# Patient Record
Sex: FEMALE | Race: WHITE | Employment: FULL TIME | URBAN - METROPOLITAN AREA
[De-identification: names, ages, dates, MRNs, and addresses within clinical notes are randomized per-mention and may not be internally consistent; named-entity substitution may affect disease eponyms.]

---

## 2019-09-04 ENCOUNTER — HOSPITAL ENCOUNTER (OUTPATIENT)
Dept: CT IMAGING | Facility: HOSPITAL | Age: 54
Discharge: HOME | End: 2019-09-04
Attending: OTOLARYNGOLOGY
Payer: COMMERCIAL

## 2019-09-04 DIAGNOSIS — J32.0 CHRONIC MAXILLARY SINUSITIS: ICD-10-CM

## 2019-09-04 PROCEDURE — 70486 CT MAXILLOFACIAL W/O DYE: CPT

## 2019-09-09 ENCOUNTER — TELEPHONE (OUTPATIENT)
Dept: NEUROLOGY | Facility: CLINIC | Age: 54
End: 2019-09-09

## 2019-09-09 NOTE — TELEPHONE ENCOUNTER
Patient called back to schedule appt  Her schedule changes week to week, will call back to reschedule

## 2022-09-10 ENCOUNTER — APPOINTMENT (OUTPATIENT)
Dept: LAB | Facility: HOSPITAL | Age: 57
End: 2022-09-10
Payer: COMMERCIAL

## 2022-09-10 ENCOUNTER — OFFICE VISIT (OUTPATIENT)
Dept: LAB | Facility: HOSPITAL | Age: 57
End: 2022-09-10
Payer: COMMERCIAL

## 2022-09-10 DIAGNOSIS — G47.33 OBSTRUCTIVE SLEEP APNEA: ICD-10-CM

## 2022-09-10 LAB
ANION GAP SERPL CALCULATED.3IONS-SCNC: 8 MMOL/L (ref 4–13)
BASOPHILS # BLD AUTO: 0.05 THOUSANDS/ΜL (ref 0–0.1)
BASOPHILS NFR BLD AUTO: 1 % (ref 0–1)
BUN SERPL-MCNC: 20 MG/DL (ref 5–25)
CALCIUM SERPL-MCNC: 9 MG/DL (ref 8.3–10.1)
CHLORIDE SERPL-SCNC: 100 MMOL/L (ref 96–108)
CO2 SERPL-SCNC: 28 MMOL/L (ref 21–32)
CREAT SERPL-MCNC: 1.38 MG/DL (ref 0.6–1.3)
EOSINOPHIL # BLD AUTO: 0.13 THOUSAND/ΜL (ref 0–0.61)
EOSINOPHIL NFR BLD AUTO: 2 % (ref 0–6)
ERYTHROCYTE [DISTWIDTH] IN BLOOD BY AUTOMATED COUNT: 13.2 % (ref 11.6–15.1)
GFR SERPL CREATININE-BSD FRML MDRD: 42 ML/MIN/1.73SQ M
GLUCOSE P FAST SERPL-MCNC: 95 MG/DL (ref 65–99)
HCT VFR BLD AUTO: 42.5 % (ref 34.8–46.1)
HGB BLD-MCNC: 14.1 G/DL (ref 11.5–15.4)
IMM GRANULOCYTES # BLD AUTO: 0.05 THOUSAND/UL (ref 0–0.2)
IMM GRANULOCYTES NFR BLD AUTO: 1 % (ref 0–2)
LYMPHOCYTES # BLD AUTO: 2.11 THOUSANDS/ΜL (ref 0.6–4.47)
LYMPHOCYTES NFR BLD AUTO: 24 % (ref 14–44)
MCH RBC QN AUTO: 29 PG (ref 26.8–34.3)
MCHC RBC AUTO-ENTMCNC: 33.2 G/DL (ref 31.4–37.4)
MCV RBC AUTO: 87 FL (ref 82–98)
MONOCYTES # BLD AUTO: 0.71 THOUSAND/ΜL (ref 0.17–1.22)
MONOCYTES NFR BLD AUTO: 8 % (ref 4–12)
NEUTROPHILS # BLD AUTO: 5.87 THOUSANDS/ΜL (ref 1.85–7.62)
NEUTS SEG NFR BLD AUTO: 64 % (ref 43–75)
NRBC BLD AUTO-RTO: 0 /100 WBCS
PLATELET # BLD AUTO: 244 THOUSANDS/UL (ref 149–390)
PMV BLD AUTO: 9.2 FL (ref 8.9–12.7)
POTASSIUM SERPL-SCNC: 4 MMOL/L (ref 3.5–5.3)
RBC # BLD AUTO: 4.86 MILLION/UL (ref 3.81–5.12)
SODIUM SERPL-SCNC: 136 MMOL/L (ref 135–147)
WBC # BLD AUTO: 8.92 THOUSAND/UL (ref 4.31–10.16)

## 2022-09-10 PROCEDURE — 36415 COLL VENOUS BLD VENIPUNCTURE: CPT

## 2022-09-10 PROCEDURE — 80048 BASIC METABOLIC PNL TOTAL CA: CPT

## 2022-09-10 PROCEDURE — 85025 COMPLETE CBC W/AUTO DIFF WBC: CPT

## 2022-09-10 PROCEDURE — 93005 ELECTROCARDIOGRAM TRACING: CPT

## 2022-09-15 LAB
ATRIAL RATE: 79 BPM
P AXIS: 30 DEGREES
PR INTERVAL: 180 MS
QRS AXIS: 6 DEGREES
QRSD INTERVAL: 88 MS
QT INTERVAL: 380 MS
QTC INTERVAL: 435 MS
T WAVE AXIS: 21 DEGREES
VENTRICULAR RATE: 79 BPM

## 2022-09-15 PROCEDURE — 93010 ELECTROCARDIOGRAM REPORT: CPT | Performed by: INTERNAL MEDICINE

## 2022-10-11 ENCOUNTER — OFFICE VISIT (OUTPATIENT)
Dept: SLEEP CENTER | Facility: CLINIC | Age: 57
End: 2022-10-11
Payer: COMMERCIAL

## 2022-10-11 VITALS
WEIGHT: 198 LBS | SYSTOLIC BLOOD PRESSURE: 94 MMHG | HEART RATE: 89 BPM | HEIGHT: 67 IN | DIASTOLIC BLOOD PRESSURE: 64 MMHG | BODY MASS INDEX: 31.08 KG/M2

## 2022-10-11 DIAGNOSIS — G47.33 OSA (OBSTRUCTIVE SLEEP APNEA): Primary | ICD-10-CM

## 2022-10-11 PROBLEM — R29.898 LEFT LEG WEAKNESS: Status: ACTIVE | Noted: 2019-06-01

## 2022-10-11 PROBLEM — E04.2 MULTIPLE THYROID NODULES: Status: ACTIVE | Noted: 2022-10-07

## 2022-10-11 PROBLEM — K21.9 GERD (GASTROESOPHAGEAL REFLUX DISEASE): Status: ACTIVE | Noted: 2019-05-31

## 2022-10-11 PROBLEM — M47.816 LUMBAR SPONDYLOSIS: Status: ACTIVE | Noted: 2020-09-24

## 2022-10-11 PROBLEM — J45.909 ASTHMA: Status: ACTIVE | Noted: 2019-05-31

## 2022-10-11 PROBLEM — M43.10 SPONDYLOLISTHESIS, ACQUIRED: Status: ACTIVE | Noted: 2020-09-02

## 2022-10-11 PROCEDURE — 99204 OFFICE O/P NEW MOD 45 MIN: CPT | Performed by: PSYCHIATRY & NEUROLOGY

## 2022-10-11 RX ORDER — AMLODIPINE BESYLATE 5 MG/1
5 TABLET ORAL DAILY
COMMUNITY
Start: 2022-09-20

## 2022-10-11 RX ORDER — FUROSEMIDE 40 MG/1
40 TABLET ORAL DAILY
COMMUNITY
Start: 2022-09-20

## 2022-10-11 RX ORDER — METHIMAZOLE 5 MG/1
5 TABLET ORAL DAILY
COMMUNITY

## 2022-10-11 RX ORDER — AMITRIPTYLINE HYDROCHLORIDE 25 MG/1
25 TABLET, FILM COATED ORAL
COMMUNITY
Start: 2022-08-22

## 2022-10-11 RX ORDER — OMEPRAZOLE 40 MG/1
40 CAPSULE, DELAYED RELEASE ORAL DAILY
COMMUNITY
Start: 2022-09-17

## 2022-10-11 RX ORDER — LIRAGLUTIDE 6 MG/ML
INJECTION, SOLUTION SUBCUTANEOUS
COMMUNITY
Start: 2022-10-06

## 2022-10-11 NOTE — PROGRESS NOTES
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  We discussed that once the hypoglossal nerve stimulator is activated, we will not know efficacy of the device until a sleep study is done, which is usually 3 months after activation  There is a chance that symptoms could worsen until they improve when optimal device settings are obtained  If she cannot tolerate device or she had a residually high AHI, she would not be a safe , and therefore would need to use CPAP or avoid bus driving  She thought about this and believes she will be able to do light duty, a non-driving position  Furthermore, she has an option of short-term disability which she would be willing to use if needed  Typically as noted, the sleep study is performed 3 months after device activation  We may consider a shorter time line in her case given that this relates to her employment  One could make the argument she has shift work disorder as she has an irregular sleep schedule due to her irregular work schedule  Consideration could be made for medication as treatment such as modafinil or Sunosi but this would need to be balanced by her medical history which includes hypertension (although her blood pressure is on the low side today)        Subjective:      Patient ID: Mak Herrera is a 60 y o  female    This is a 52-year-old woman referred for consultation as she has interest in a hypoglossal nerve stimulator  The patient had a home sleep test at [address] in February 2022-this test showed severe obstructive sleep apnea with a respiratory event index of 37 4 per the report  Central apneas were not identified on that test, out of 117 apneas recorded, all were marked as obstructive  In addition there were 141 hypopneas  That test was ordered as she was investigating bariatric surgery and was told that if she had ENRRIQUE she may qualify for bariatric surgery  Prior to starting CPAP she had severe snoring, dry mouth, daytime sleepiness- relied upon Monster to stay awake    Of note, the patient has a CDL and drives a bus  Auto-CPAP was recommended to her by Dr Landon Craven at [address]  At a follow-up in June 2022, it was noted she was using her machine for 7 hours a night with an AHI of 6 per hour with treatment  Weight was about 224 pounds at that time  CPAP was prescribed which has helped to some degree  With a good mask fit, she has benefit with less sleepiness   Does not snore with CPAP use  She was referred to me by Dr Minor Schirmer from Otolaryngology  She consult with him to investigate a hypoglossal nerve stimulator  She had a drug-induced sleep endoscopy under his guidance which showed AP collapse of the airway, there is no sign of complete concentric collapse  Surgery is scheduled in December 2020    The patient is accompanied by her  today who assists in the history  She is - can starts at 430 AM, can work until 8 pm, sometimes 12 am   Has an irregular schedule  Works 40-80 hours/week    She sleeps from 6-7 hours a night  Bedtime varies, can go to bed 830-930 PM  Can wake as early as 3-4 AM for work  CPAP data-reviewed today  [employer] - auto CPAP set at 5-10 cm h20  AHI 4 8  Used 30/30 nights  Average session 7 hr 45 min     With CPAP- + dry mouth    No nasal congestion, + nasal dryness, no nose matthew  Uses a FFM    Louise Sleepiness Scale:     Sitting and reading: Moderate chance of dozing  Watching TV: Moderate chance of dozing  Sitting, inactive in a public place (e g  a theatre or a meeting): Moderate chance of dozing  As a passenger in a car for an hour without a break: Would never doze  Lying down to rest in the afternoon when circumstances permit: High chance of dozing  Sitting and talking to someone: Would never doze  Sitting quietly after a lunch without alcohol: Moderate chance of dozing  In a car, while stopped for a few minutes in traffic: Would never doze  Total score: 11     The following portions of the patient's history were reviewed and updated as appropriate: allergies, current medications, past family history, past medical history, past social history, past surgical history, and problem list     Review of Systems    Genitourinary none   Cardiology none   Gastrointestinal frequent heartburn/acid reflux   Neurology awaken with headache   Constitutional weight change   Integumentary none   Psychiatry none   Musculoskeletal legs twitching/jerking   Pulmonary snoring   ENT none   Endocrine none   Hematological none       Objective:        BP 94/64 (BP Location: Left arm, Patient Position: Sitting, Cuff Size: Adult)   Pulse 89   Ht 5' 7" (1 702 m)   Wt 89 8 kg (198 lb)   BMI 31 01 kg/m²     Physical Exam  Constitutional:       Appearance: Normal appearance  HENT:      Head: Normocephalic and atraumatic  Mouth/Throat:      Mouth: Mucous membranes are moist    Eyes:      Extraocular Movements: Extraocular movements intact  Pupils: Pupils are equal, round, and reactive to light  Cardiovascular:      Rate and Rhythm: Normal rate  Pulses: Normal pulses  Heart sounds: Normal heart sounds  Pulmonary:      Effort: Pulmonary effort is normal       Breath sounds: Normal breath sounds  Musculoskeletal:      Right lower leg: No edema  Left lower leg: No edema  Neurological:      Mental Status: She is alert  Psychiatric:         Mood and Affect: Mood normal          Behavior: Behavior normal          Thought Content:  Thought content normal          Judgment: Judgment normal         13 25 in neck circimference  malampati 4 airway, elongated soft palate

## 2022-10-11 NOTE — PROGRESS NOTES
Review of Systems      Genitourinary none   Cardiology none   Gastrointestinal frequent heartburn/acid reflux   Neurology awaken with headache   Constitutional weight change   Integumentary none   Psychiatry none   Musculoskeletal legs twitching/jerking   Pulmonary snoring   ENT none   Endocrine none   Hematological none

## 2022-10-12 ENCOUNTER — TELEPHONE (OUTPATIENT)
Dept: SLEEP CENTER | Facility: CLINIC | Age: 57
End: 2022-10-12

## 2022-10-12 NOTE — TELEPHONE ENCOUNTER
----- Message from Greg [place] sent at 10/11/2022  8:20 AM EDT -----  Regarding: ANUJA  Good morning,     I just scheduled pt with the INSPIRE procedure on 2022  I see she has an appointment today with Dr Emperatriz Payton  I just wanted to make your team aware so an activation date can be coordinated with her surgery date  Please let me know if there is anything else you need       Thank you in advance! :)

## 2022-12-02 ENCOUNTER — APPOINTMENT (OUTPATIENT)
Dept: LAB | Facility: HOSPITAL | Age: 57
End: 2022-12-02

## 2022-12-02 DIAGNOSIS — G47.33 OBSTRUCTIVE SLEEP APNEA: ICD-10-CM

## 2022-12-02 DIAGNOSIS — Z01.818 PRE-OPERATIVE EXAMINATION: ICD-10-CM

## 2022-12-02 LAB
ANION GAP SERPL CALCULATED.3IONS-SCNC: 7 MMOL/L (ref 4–13)
BASOPHILS # BLD AUTO: 0.07 THOUSANDS/ÂΜL (ref 0–0.1)
BASOPHILS NFR BLD AUTO: 1 % (ref 0–1)
BUN SERPL-MCNC: 25 MG/DL (ref 5–25)
CALCIUM SERPL-MCNC: 9.1 MG/DL (ref 8.3–10.1)
CHLORIDE SERPL-SCNC: 103 MMOL/L (ref 96–108)
CO2 SERPL-SCNC: 28 MMOL/L (ref 21–32)
CREAT SERPL-MCNC: 1.32 MG/DL (ref 0.6–1.3)
EOSINOPHIL # BLD AUTO: 0.13 THOUSAND/ÂΜL (ref 0–0.61)
EOSINOPHIL NFR BLD AUTO: 2 % (ref 0–6)
ERYTHROCYTE [DISTWIDTH] IN BLOOD BY AUTOMATED COUNT: 13.2 % (ref 11.6–15.1)
GFR SERPL CREATININE-BSD FRML MDRD: 44 ML/MIN/1.73SQ M
GLUCOSE P FAST SERPL-MCNC: 90 MG/DL (ref 65–99)
HCT VFR BLD AUTO: 44 % (ref 34.8–46.1)
HGB BLD-MCNC: 14.4 G/DL (ref 11.5–15.4)
IMM GRANULOCYTES # BLD AUTO: 0.02 THOUSAND/UL (ref 0–0.2)
IMM GRANULOCYTES NFR BLD AUTO: 0 % (ref 0–2)
LYMPHOCYTES # BLD AUTO: 2.37 THOUSANDS/ÂΜL (ref 0.6–4.47)
LYMPHOCYTES NFR BLD AUTO: 29 % (ref 14–44)
MCH RBC QN AUTO: 29.2 PG (ref 26.8–34.3)
MCHC RBC AUTO-ENTMCNC: 32.7 G/DL (ref 31.4–37.4)
MCV RBC AUTO: 89 FL (ref 82–98)
MONOCYTES # BLD AUTO: 0.72 THOUSAND/ÂΜL (ref 0.17–1.22)
MONOCYTES NFR BLD AUTO: 9 % (ref 4–12)
NEUTROPHILS # BLD AUTO: 4.86 THOUSANDS/ÂΜL (ref 1.85–7.62)
NEUTS SEG NFR BLD AUTO: 59 % (ref 43–75)
NRBC BLD AUTO-RTO: 0 /100 WBCS
PLATELET # BLD AUTO: 257 THOUSANDS/UL (ref 149–390)
PMV BLD AUTO: 9.2 FL (ref 8.9–12.7)
POTASSIUM SERPL-SCNC: 4.3 MMOL/L (ref 3.5–5.3)
RBC # BLD AUTO: 4.93 MILLION/UL (ref 3.81–5.12)
SODIUM SERPL-SCNC: 138 MMOL/L (ref 135–147)
WBC # BLD AUTO: 8.17 THOUSAND/UL (ref 4.31–10.16)

## 2022-12-06 RX ORDER — CALCITONIN SALMON 200 [IU]/.09ML
1 SPRAY, METERED NASAL DAILY
COMMUNITY

## 2022-12-06 RX ORDER — BUDESONIDE AND FORMOTEROL FUMARATE DIHYDRATE 160; 4.5 UG/1; UG/1
2 AEROSOL RESPIRATORY (INHALATION) 2 TIMES DAILY
COMMUNITY

## 2022-12-06 NOTE — PRE-PROCEDURE INSTRUCTIONS
Pre-Surgery Instructions:   Medication Instructions   • amitriptyline (ELAVIL) 25 mg tablet Take night before surgery   • amLODIPine (NORVASC) 5 mg tablet Take day of surgery  • aspirin (ECOTRIN LOW STRENGTH) 81 mg EC tablet Instructions provided by MD   • budesonide-formoterol (SYMBICORT) 160-4 5 mcg/act inhaler Take day of surgery  • calcitonin, salmon, (MIACALCIN) 200 units/act nasal spray Hold day of surgery  • cholecalciferol (VITAMIN D3) 1,000 units tablet Stop taking 7 days prior to surgery  • famotidine (PEPCID) 40 MG tablet Take night before surgery   • fexofenadine (ALLEGRA) 180 MG tablet Take day of surgery  • furosemide (LASIX) 40 mg tablet Take night before surgery   • methimazole (TAPAZOLE) 5 mg tablet Take day of surgery  • metoprolol tartrate (LOPRESSOR) 50 mg tablet Take day of surgery  • Multiple Vitamins-Minerals (CENTRUM SILVER 50+WOMEN) TABS Stop taking 7 days prior to surgery  • omeprazole (PriLOSEC) 40 MG capsule Take night before surgery   • Saxenda injection Hold day of surgery  • sertraline (ZOLOFT) 50 mg tablet Take day of surgery  • terazosin (HYTRIN) 2 mg capsule Take night before surgery   • triamterene-hydrochlorothiazide (DYAZIDE) 37 5-25 mg per capsule Take night before surgery   Verbal pre-op instructions given to pt  Via phone  Pt verbalizes understanding

## 2022-12-07 ENCOUNTER — ANESTHESIA EVENT (OUTPATIENT)
Dept: PERIOP | Facility: HOSPITAL | Age: 57
End: 2022-12-07

## 2022-12-08 ENCOUNTER — ANESTHESIA (OUTPATIENT)
Dept: PERIOP | Facility: HOSPITAL | Age: 57
End: 2022-12-08

## 2022-12-08 ENCOUNTER — HOSPITAL ENCOUNTER (OUTPATIENT)
Facility: HOSPITAL | Age: 57
Setting detail: OUTPATIENT SURGERY
Discharge: HOME | End: 2022-12-08
Attending: OTOLARYNGOLOGY | Admitting: OTOLARYNGOLOGY

## 2022-12-08 ENCOUNTER — APPOINTMENT (OUTPATIENT)
Dept: RADIOLOGY | Facility: HOSPITAL | Age: 57
End: 2022-12-08

## 2022-12-08 VITALS
TEMPERATURE: 97.2 F | HEART RATE: 87 BPM | WEIGHT: 200.18 LBS | SYSTOLIC BLOOD PRESSURE: 121 MMHG | HEIGHT: 67 IN | BODY MASS INDEX: 31.42 KG/M2 | OXYGEN SATURATION: 93 % | RESPIRATION RATE: 16 BRPM | DIASTOLIC BLOOD PRESSURE: 70 MMHG

## 2022-12-08 DIAGNOSIS — G47.33 OSA (OBSTRUCTIVE SLEEP APNEA): ICD-10-CM

## 2022-12-08 DEVICE — LEAD STIMULATION RESP INSPIRE: Type: IMPLANTABLE DEVICE | Site: NECK | Status: FUNCTIONAL

## 2022-12-08 DEVICE — THE MODEL 3028 IPG CONTAINS ELECTRONICS AND A BATTERY THAT ARE SEALED INSIDE A TITANIUM CASE.  THE IPG IS IMPLANTED SUBCUTANEOUSLY, BELOW THE CLAVICLE IN THE UPPER CHEST, AND CONNECT TO THE STIMULATION LEAD AND SENSING LEAD.  THE MODEL 3028 DOES NOT CONTAIN ANY SOFTWARE OR FIRMWARE.  ALL FUNCTIONS INCLUDING THE TELEMETRY AND ALGORITHM HAVE BEEN DESIGNED INTO THE HARDWARE OF THE IPG.  THE ALGORITHM SYNCHRONIZES STIMULATION OF THE HYPOGLOSSAL NERVE WITH RESPIRATION SIGNALS.   THE IPG PROCESSES THE SAME DYNAMIC RANGE OF PRESSURE SIGNALS (2-48 CMH2O) IN ORDER TO ACCOUNT FOR PRESSURE READING VARIABILITY.  BASED ON TYPICAL SETTINGS FROM THE STAR PIVOTAL TRIAL, THE LONGEVITY OF THE MODEL 3028’S BATTERY WILL AVERAGE 10 YEARS ALTHOUGH THE DEVICE IS SMALLER THAN THE CURRENTLY APPROVED VERSION (MODEL 3024).  IN ADDITION THE MODEL 3028 IPG WILL ALLOW PATIENTS TO SAFELY UNDERGO MAGNETIC RESONANCE IMAGING (MRI) UNDER SPECIFIED CONDITIONS.
Type: IMPLANTABLE DEVICE | Site: CHEST | Status: FUNCTIONAL
Brand: INSPIRE

## 2022-12-08 DEVICE — THE SENSING LEAD INCORPORATES A DIFFERENTIAL PRESSURE SENSOR THAT DETECTS RESPIRATORY CYCLES.  THE CONNECTOR END OF THE LEAD IS CONNECTED TO THE IPG.  THE SENSOR IS IMPLANTED IN BETWEEN THE INTERCOSTAL MUSCLE LAYERS. THE DEVICE SENSES RELATIVE PRESSURE VARIATIONS THAT CORRESPOND TO THE RESPIRATION CYCLE.  THE PRESSURE WAVEFORM IS MONITORED BY THE IPG AND TRIGGERS STIMULATION THERAPY SYNCHRONOUS WITH INSPIRATION
Type: IMPLANTABLE DEVICE | Site: CHEST | Status: FUNCTIONAL
Brand: INSPIRE

## 2022-12-08 RX ORDER — SODIUM CHLORIDE, SODIUM LACTATE, POTASSIUM CHLORIDE, CALCIUM CHLORIDE 600; 310; 30; 20 MG/100ML; MG/100ML; MG/100ML; MG/100ML
INJECTION, SOLUTION INTRAVENOUS CONTINUOUS PRN
Status: DISCONTINUED | OUTPATIENT
Start: 2022-12-08 | End: 2022-12-08

## 2022-12-08 RX ORDER — OXYCODONE HCL 5 MG/5 ML
5 SOLUTION, ORAL ORAL EVERY 4 HOURS PRN
Status: DISCONTINUED | OUTPATIENT
Start: 2022-12-08 | End: 2022-12-08 | Stop reason: HOSPADM

## 2022-12-08 RX ORDER — SODIUM CHLORIDE 9 MG/ML
125 INJECTION, SOLUTION INTRAVENOUS CONTINUOUS
Status: DISCONTINUED | OUTPATIENT
Start: 2022-12-08 | End: 2022-12-08 | Stop reason: HOSPADM

## 2022-12-08 RX ORDER — OXYCODONE HYDROCHLORIDE 5 MG/1
5 TABLET ORAL EVERY 4 HOURS PRN
Qty: 10 TABLET | Refills: 0 | Status: SHIPPED | OUTPATIENT
Start: 2022-12-08 | End: 2022-12-18

## 2022-12-08 RX ORDER — LIDOCAINE HYDROCHLORIDE 20 MG/ML
INJECTION, SOLUTION EPIDURAL; INFILTRATION; INTRACAUDAL; PERINEURAL AS NEEDED
Status: DISCONTINUED | OUTPATIENT
Start: 2022-12-08 | End: 2022-12-08

## 2022-12-08 RX ORDER — CEFAZOLIN SODIUM 2 G/50ML
SOLUTION INTRAVENOUS AS NEEDED
Status: DISCONTINUED | OUTPATIENT
Start: 2022-12-08 | End: 2022-12-08

## 2022-12-08 RX ORDER — FENTANYL CITRATE 50 UG/ML
INJECTION, SOLUTION INTRAMUSCULAR; INTRAVENOUS AS NEEDED
Status: DISCONTINUED | OUTPATIENT
Start: 2022-12-08 | End: 2022-12-08

## 2022-12-08 RX ORDER — EPHEDRINE SULFATE 50 MG/ML
INJECTION INTRAVENOUS AS NEEDED
Status: DISCONTINUED | OUTPATIENT
Start: 2022-12-08 | End: 2022-12-08

## 2022-12-08 RX ORDER — FENTANYL CITRATE/PF 50 MCG/ML
25 SYRINGE (ML) INJECTION
Status: DISCONTINUED | OUTPATIENT
Start: 2022-12-08 | End: 2022-12-08 | Stop reason: HOSPADM

## 2022-12-08 RX ORDER — PROPOFOL 10 MG/ML
INJECTION, EMULSION INTRAVENOUS AS NEEDED
Status: DISCONTINUED | OUTPATIENT
Start: 2022-12-08 | End: 2022-12-08

## 2022-12-08 RX ORDER — MAGNESIUM HYDROXIDE 1200 MG/15ML
LIQUID ORAL AS NEEDED
Status: DISCONTINUED | OUTPATIENT
Start: 2022-12-08 | End: 2022-12-08 | Stop reason: HOSPADM

## 2022-12-08 RX ORDER — ONDANSETRON 2 MG/ML
4 INJECTION INTRAMUSCULAR; INTRAVENOUS ONCE AS NEEDED
Status: DISCONTINUED | OUTPATIENT
Start: 2022-12-08 | End: 2022-12-08 | Stop reason: HOSPADM

## 2022-12-08 RX ORDER — LIDOCAINE HYDROCHLORIDE AND EPINEPHRINE 10; 10 MG/ML; UG/ML
INJECTION, SOLUTION INFILTRATION; PERINEURAL AS NEEDED
Status: DISCONTINUED | OUTPATIENT
Start: 2022-12-08 | End: 2022-12-08 | Stop reason: HOSPADM

## 2022-12-08 RX ORDER — DEXAMETHASONE SODIUM PHOSPHATE 10 MG/ML
INJECTION, SOLUTION INTRAMUSCULAR; INTRAVENOUS AS NEEDED
Status: DISCONTINUED | OUTPATIENT
Start: 2022-12-08 | End: 2022-12-08

## 2022-12-08 RX ORDER — ONDANSETRON 2 MG/ML
INJECTION INTRAMUSCULAR; INTRAVENOUS AS NEEDED
Status: DISCONTINUED | OUTPATIENT
Start: 2022-12-08 | End: 2022-12-08

## 2022-12-08 RX ORDER — ACETAMINOPHEN 160 MG/5ML
650 SUSPENSION, ORAL (FINAL DOSE FORM) ORAL ONCE
Status: DISCONTINUED | OUTPATIENT
Start: 2022-12-08 | End: 2022-12-08 | Stop reason: HOSPADM

## 2022-12-08 RX ORDER — SUCCINYLCHOLINE/SOD CL,ISO/PF 100 MG/5ML
SYRINGE (ML) INTRAVENOUS AS NEEDED
Status: DISCONTINUED | OUTPATIENT
Start: 2022-12-08 | End: 2022-12-08

## 2022-12-08 RX ORDER — CEFAZOLIN SODIUM 2 G/50ML
2000 SOLUTION INTRAVENOUS ONCE
Status: CANCELLED | OUTPATIENT
Start: 2022-12-08 | End: 2022-12-08

## 2022-12-08 RX ORDER — GLYCOPYRROLATE 0.2 MG/ML
INJECTION INTRAMUSCULAR; INTRAVENOUS AS NEEDED
Status: DISCONTINUED | OUTPATIENT
Start: 2022-12-08 | End: 2022-12-08

## 2022-12-08 RX ORDER — CHLORAL HYDRATE 500 MG
1000 CAPSULE ORAL DAILY
COMMUNITY

## 2022-12-08 RX ADMIN — SODIUM CHLORIDE 125 ML/HR: 0.9 INJECTION, SOLUTION INTRAVENOUS at 06:25

## 2022-12-08 RX ADMIN — PROPOFOL 200 MG: 10 INJECTION, EMULSION INTRAVENOUS at 07:34

## 2022-12-08 RX ADMIN — CEFAZOLIN SODIUM 2000 MG: 2 SOLUTION INTRAVENOUS at 07:40

## 2022-12-08 RX ADMIN — EPHEDRINE SULFATE 10 MG: 50 INJECTION, SOLUTION INTRAVENOUS at 07:48

## 2022-12-08 RX ADMIN — Medication 140 MG: at 07:34

## 2022-12-08 RX ADMIN — ONDANSETRON 4 MG: 2 INJECTION INTRAMUSCULAR; INTRAVENOUS at 09:09

## 2022-12-08 RX ADMIN — LIDOCAINE HYDROCHLORIDE 100 MG: 20 INJECTION, SOLUTION EPIDURAL; INFILTRATION; INTRACAUDAL; PERINEURAL at 07:34

## 2022-12-08 RX ADMIN — PHENYLEPHRINE HYDROCHLORIDE 100 MCG/MIN: 10 INJECTION INTRAVENOUS at 08:09

## 2022-12-08 RX ADMIN — FENTANYL CITRATE 50 MCG: 50 INJECTION INTRAMUSCULAR; INTRAVENOUS at 07:34

## 2022-12-08 RX ADMIN — GLYCOPYRROLATE 0.2 MG: 0.2 INJECTION, SOLUTION INTRAMUSCULAR; INTRAVENOUS at 08:05

## 2022-12-08 RX ADMIN — SODIUM CHLORIDE, SODIUM LACTATE, POTASSIUM CHLORIDE, AND CALCIUM CHLORIDE: .6; .31; .03; .02 INJECTION, SOLUTION INTRAVENOUS at 08:05

## 2022-12-08 RX ADMIN — FENTANYL CITRATE 50 MCG: 50 INJECTION INTRAMUSCULAR; INTRAVENOUS at 07:25

## 2022-12-08 RX ADMIN — DEXAMETHASONE SODIUM PHOSPHATE 10 MG: 10 INJECTION INTRAMUSCULAR; INTRAVENOUS at 07:50

## 2022-12-08 RX ADMIN — PROPOFOL 50 MG: 10 INJECTION, EMULSION INTRAVENOUS at 08:17

## 2022-12-08 RX ADMIN — PROPOFOL 20 MG: 10 INJECTION, EMULSION INTRAVENOUS at 09:00

## 2022-12-08 NOTE — ANESTHESIA POSTPROCEDURE EVALUATION
Post-Op Assessment Note    CV Status:  Stable  Pain Score: 0    Pain management: adequate     Mental Status:  Alert and awake   Hydration Status:  Euvolemic   PONV Controlled:  Controlled   Airway Patency:  Patent  Airway: intubated   Two or more mitigation strategies used for obstructive sleep apnea   Post Op Vitals Reviewed: Yes      Staff: Anesthesiologist, CRNA         No notable events documented      BP      Temp     Pulse     Resp      SpO2      /70   Pulse 87   Temp (!) 97 2 °F (36 2 °C) (Temporal)   Resp 16   Ht 5' 7" (1 702 m)   Wt 90 8 kg (200 lb 2 8 oz)   SpO2 93%   BMI 31 35 kg/m²

## 2022-12-08 NOTE — ANESTHESIA PREPROCEDURE EVALUATION
Procedure:  INSERTION UPPER AIRWAY STIMULATOR (INSPIRE IMPLANT) (Head)    Relevant Problems   CARDIO   (+) HTN (hypertension)      GI/HEPATIC   (+) GERD (gastroesophageal reflux disease)      MUSCULOSKELETAL   (+) Lumbar spondylosis   (+) Osteoarthritis of spine with radiculopathy, lumbar region      NEURO/PSYCH   (+) Anxiety      PULMONARY   (+) Asthma   (+) COPD (chronic obstructive pulmonary disease) (HCC)   (+) ENRRIQUE (obstructive sleep apnea)      Endocrine   (+) Graves disease        Physical Exam    Airway    Mallampati score: III  TM Distance: <3 FB  Neck ROM: full     Dental   No notable dental hx     Cardiovascular  Rhythm: regular, Rate: normal, Cardiovascular exam normal    Pulmonary  Pulmonary exam normal Breath sounds clear to auscultation,     Other Findings        Anesthesia Plan  ASA Score- 3     Anesthesia Type- general with ASA Monitors  Additional Monitors:   Airway Plan: ETT  Plan Factors-    Chart reviewed  Patient summary reviewed  Induction- intravenous  Postoperative Plan- Plan for postoperative opioid use  Informed Consent- Anesthetic plan and risks discussed with patient

## 2022-12-08 NOTE — H&P
Krystle Castillo is a 59 y  o female who presents for re-evaluation of sleep apnea  Planned for inspire  No further problems  Used ASA 1 time in the last week  Past Medical History:   Diagnosis Date   • Allergic rhinitis    • Anxiety    • Arthritis    • Asthma    • Colon polyp    • COPD (chronic obstructive pulmonary disease) ([facility] [address] )    • COVID     Dec 2020   • CPAP (continuous positive airway pressure) dependence    • Disease of thyroid gland 2021   • GERD (gastroesophageal reflux disease) 2019   • History of lumbar fusion    • Hypertension    • Osteoporosis    • Skin cancer     on nose in past   • Sleep apnea    • Vapes nicotine containing substance    • Wears glasses        /72   Pulse 92   Temp 98 °F (36 7 °C) (Temporal)   Resp 16   Ht 5' 7" (1 702 m)   Wt 90 8 kg (200 lb 2 8 oz)   SpO2 93%   BMI 31 35 kg/m²       Physical Exam   Constitutional: Oriented to person, place, and time  Well-developed and well-nourished, no apparent distress, non-toxic appearance  Cooperative, able to hear and answer questions without difficulty  Voice: Normal voice quality  Head: Normocephalic, atraumatic  No scars, masses or lesions  Face: Symmetric, no edema, no sinus tenderness  Eyes: Vision grossly intact, extra-ocular movement intact  Ears: External ear normal   Bilateral tympanic membranes are intact with intact normal landmarks  No post-auricular erythema or tenderness  Nose: Septum midline, nares clear  Mucosa moist, turbinates well appearing  No crusting, polyps or discharge evident  Oral cavity: Dentition intact  Mucosa moist, lips normal   Tongue mobile, floor of mouth normal   Hard palate unremarkable  No masses or lesions  Oropharynx: Uvula is midline, soft palate normal   Unremarkable oropharyngeal inlet  Tonsils unremarkable  Posterior pharyngeal wall clear  No masses or lesions  Salivary glands:  Parotid glands and submandibular glands symmetric, no enlargement or tenderness    Neck: Normal laryngeal elevation with swallow  Trachea midline  No masses or lesions  No palpable adenopathy  Thyroid: normal in size, unremarkable without tenderness or palpable nodules  Pulmonary/Chest: Normal effort and rate  No respiratory distress  Musculoskeletal: Normal range of motion  Neurological: Cranial nerves 2-12 intact  Skin: Skin is warm and dry  Psychiatric: Normal mood and affect  A/P: Obstructive sleep apnea: We discussed the nature of obstructive sleep apnea  We discussed the natural history of sleep apnea  We discussed options for management  We discussed non-surgical management including weight loss, mandibular advancement devices, and positive airway pressure therapy including various options  We discussed that she is not tolerating her CPAP and has at least moderate ENRRIQUE and BMI of 31, she would like to move forward with Inspire hypoglossal nerve stimulator  Risks, benefits, and alternatives were discussed  Will seek her insurance approval and have her return in follow up for any further discussion

## 2022-12-08 NOTE — DISCHARGE INSTRUCTIONS
[facility] Hypoglossal Nerve Stimulator    Post-Operative Care  Office [phone]  Cell [phone]               At Home (in the days immediately following the procedure):   Try to sleep with your head elevated on 2-3 pillows   Iced packs placed over wound will help reduce swelling  They should be used 20 minutes on/ 20 minutes off while awake for the first full day  Crushed ice in ziplock bags or frozen peas or corn work well  The dressings may be removed 2 days after surgery  After the dressings are removed, the wounds should be cleaned with a Q-tip soaked in hydrogen peroxide mixed 50/50 with water  Apply antibiotic ointment (Bacitracin) or Vaseline to the external incisions 3-4 times per day  Take your medicines as prescribed  REMEMBER:  DO NOT DRIVE WHILE TAKING PAIN MEDICATIONS  You should do neck rolls 10 times clockwise and 10 times counterclockwise directions for 1 week after surgery  You may shower with luke-warm water only after the dressings are removed  You may use ibuprofen (Motrin, Advil) and acetaminophen (Tyelnol) for pain control in addition to any prescribed pain medications  You may get out of bed and go to the bathroom with assistance  Eat light, soft meals as tolerated, avoiding gas-stimulating foods  Follow-up care:   Eat before coming to the office for post-operative visits  Rest for the first week after the procedure, avoiding excessive physical activities, hard chewing, lifting objects over 8 lbs (about the weight of a phone book), or bending over  We request that you do not travel by plane for one week after surgery  Clean the wound at least twice daily using 1/2 hydrogen peroxide 1/2 water solution to remove any crusting (scabbing)  Lubricate your wound after cleaning with Q-tips and antibiotic ointment to soften hardened crusts  Follow-up visits:     At one week, the sutures will be removed; you may drive yourself to this appointment (as long as you are no longer taking prescription/narcotic pain medicines)  After dressing removal, make-up may be worn, avoiding the incision lines  Additional follow-up visits will be scheduled at this time  Note that final results from the incisions may not be apparent until [place] after surgery  Healing Care:   After surgery try not to roll onto the wound while asleep  Clean the external skin gently but thoroughly with soap  The use of alcohol and tobacco products prolong swelling and healing and are best avoided for 2 weeks after surgery  Do not expose your wound to sun for 4-6 weeks after surgery  Use sunscreen (SPF 30 or higher) for 6 months after surgery if sun exposure is absolutely necessary  Avoid any physical exercise that can cause over-heating or over-exertion for two weeks after the surgery  Your nose may be swollen and stuffy for several months  Complete healing may take 12 months  It is to your advantage to return for all postoperative visits so that long-term results may be evaluated  Frequently Asked Questions:  When can I shower and shampoo my hair? You may shower the day after your surgery, BUT KEEP ANY DRESSING DRY  This may mean you wash your face/hair in the sink instead  It is important that you do not use hot water, as this can increase the swelling  Lukewarm water is best       When will the swelling and bruising go away? This usually takes 7-10 days or so, but may take less or more time, depending on the individual     When can I take aspirin? You should not take aspirin for 2 weeks prior to or after surgery  The same is true for vitamin E, ginko, garlic pills, and other “natural” supplements  When can I take ibuprofen? Non-steroidal anti-inflammatory drugs such as advil (ibuprofen), alleve (naproxen), or other similar may be used immediately after surgery as per the guidelines on the package  When can I wear my glasses?    You will be able to wear contact lenses as soon as you feel comfortable  You may wear glasses one to two weeks after surgery, depending on the type of surgery you had  In any case, you must be careful not to place any pressure on the wound  When can I wear makeup? Make-up can be applied after suture removal, but not directly on the incisions until 3 weeks after the procedure  When will I activate my device? We will wait for your healing to finish prior to activation which usually means a few weeks  The plan will be set up at your first follow up appointment at 1 week after the procedure  What about exercise? Please adhere to the following schedule:   Up to week 1 after surgery:  REST! No strenuous exercise  Walking is ok  Week 1-3 after surgery: You may begin light aerobic exercise, but no bending over/straining/lifting weights  You may begin some range of motion exercises of your shoulder  Week 3+: You may begin more strenuous exercise, such as yoga, stretching, bending over, lifting weights  Please remember to start slowly  Week 6+: You may resume contact sports, such as soccer, basketball, etc    POST-OPERATIVE APPOINTMENTS:  1 week:  wound check in the office  1 month: device activation and wound check in the office  3 months: device titration sleep study at [address] [place] [address]  4 months: final wound check in the office  Yearly: device check at office  How to contact us:    Phone: If you have questions or concerns, please call us at [phone] during business hours (8 am to 5 pm)  On nights and weekends, you may page the ENT surgeon on call  at [facility]   In case of emergency, please call [phone]

## 2022-12-08 NOTE — OP NOTE
OPERATIVE REPORT  PATIENT NAME: Krystle Castillo    :    MRN: [id number]  Pt Location: [facility] OR ROOM 04    SURGERY DATE: 2022    Surgeon(s) and Role:     * Dariana Beasley MD - Primary     * Charmaine Marshall PA-C - Assisting    Preop Diagnosis:  Obstructive sleep apnea (adult) (pediatric) [G47 33]    Post-Op Diagnosis Codes:     * Obstructive sleep apnea (adult) (pediatric) [G47 33]    Procedure(s) (LRB):  INSERTION UPPER AIRWAY STIMULATOR (INSPIRE IMPLANT) (N/A)    Specimen(s):  * No specimens in log *    Estimated Blood Loss:   Minimal    Drains:  * No LDAs found *    Anesthesia Type:   General    Operative Indications:  Obstructive sleep apnea (adult) (pediatric) [G47 33]  BMI 31    Operative Findings:  Good stimulation at 0 7 V and no retraction at 0 4 V    Complications:   None    Procedure and Technique:  Indications for procedure: Krystle Castillo is a 59 y o  female with a history of Moderate to Severe obstructive sleep apnea, who is intolerant and unable to achieve benefit from positive pressure therapy  Patient has passed the clinical, polysomnographic, and endoscopic screening criteria and presents today for the implant, whom I have seen in consultation for the above-listed procedure  After discussion of risks, benefits, and alternatives the patient elected to undergo the procedure and informed consent was obtained  Procedure in detail: The patient was brought back to the operating room laid in the supine position and general endotracheal anesthesia was administered  The patient was positioned appropriately  Appropriate time-out was taken and procedure, sidedness, and marking was confirmed  7 mL of 1% lidocaine with 1:100,000 epinephrine was infiltrated into the marked areas  Prior to prepping and draping, electrodes were placed in the genioglossus and styloglossus muscle and connected to the NIM box for intraoperative nerve monitoring   The patient was prepped and draped in standard fashion  Neuroplasty was performed as follows: The lateral branches to retrusor muscles were identified, and tested intra-operatively using the NIM stimulator  The branches were identified and the inclusion branches were stimulated with both visual and neurostimulator confirmation  The branches were dissected in 360 degrees for 1 5 cm around the TV and C1 branches with care not to include the HG branches  Insertion of an upper airway stimulator was performed as follows: The cuff electrode for the hypoglossal nerve stimulator was placed distally to these branches on the medial nerve branch to the genioglossus muscle  Diagnostic evaluation confirmed activation of the genioglossus nerve, resulting in genioglossal activation and tongue protrusion, confirmed visually  The stimulation electrode was then looped under and secured to the digastric tendon on its lateral surface with the provided anchor  Insertion of a thoracic sensor lead was performed as follows:  A second 5 cm incision was made in the right upper chest approximately 3 cm below the clavicle  Dissection was carried down to the pectoralis muscle  An inferior pocket was created deep to the subcutaneous layer and superficial to the pectoralis muscle  Dissection was carried down through pectoralis muscle using blunt dissection  The interspace between the 2nd and 3rd ribs were exposed  The external oblique muscles were identified, and bluntly dissected, and a tunnel was created between the external and internal intercostals in the 2nd intercostal space just on the superior aspect of the third rib  The pleural respiration sensor was placed into the pocket in the inferior aspect of the intercostal space  The sensor was sutured to the fascia using the provided anchors to maintain the sensor facing the pleural space  The stimulation lead was then tunneled in a subplatysmal plane and brought out into the sub-clavicular pocket       Insertion of an upper airway stimulator was continued as follows: Both the cuff electrode and the respiration sensing lead were connected to the implantable pulse generator  Diagnostic evaluation was run, which confirmed a good respiration sensing signal as well as good tongue protrusion stimulation  The implantable pulse generator was placed in the subclavicular pocket and secured loosely to the pectoralis fascia using 2-0 silk sutures  All the wounds were thoroughly irrigated with irrigation  The wounds were then closed in three layers with deep layers closed with 3-0 Vicryl and the skin closed with 4-0 Monocryl  Wound dressings were placed  The patient was returned to the care of Anesthesia, extubated without difficulty, and taken to the recovery area in stable condition  All instruments and sponge counts were correct at the end of the procedure  Ericka Nathan MD, was present for and performed all key elements of the procedure       I was present for the entire procedure, A qualified resident physician was not available and A physician assistant was required during the procedure for retraction tissue handling,dissection and suturing    Patient Disposition:  PACU  and extubated and stable        SIGNATURE: Yung Raza MD  DATE: December 8, 2022  TIME: 9:29 AM

## 2023-01-17 ENCOUNTER — TELEPHONE (OUTPATIENT)
Dept: SLEEP CENTER | Facility: CLINIC | Age: 58
End: 2023-01-17

## 2023-01-17 ENCOUNTER — OFFICE VISIT (OUTPATIENT)
Dept: SLEEP CENTER | Facility: CLINIC | Age: 58
End: 2023-01-17

## 2023-01-17 VITALS
HEART RATE: 81 BPM | DIASTOLIC BLOOD PRESSURE: 62 MMHG | HEIGHT: 68 IN | SYSTOLIC BLOOD PRESSURE: 100 MMHG | WEIGHT: 203.8 LBS | BODY MASS INDEX: 30.89 KG/M2

## 2023-01-17 DIAGNOSIS — G47.33 OSA (OBSTRUCTIVE SLEEP APNEA): Primary | ICD-10-CM

## 2023-01-17 DIAGNOSIS — Z45.42 ENCOUNTER FOR ADJUSTMENT AND MANAGEMENT OF NEUROSTIMULATOR: ICD-10-CM

## 2023-01-17 DIAGNOSIS — Z96.82 S/P INSERTION OF HYPOGLOSSAL NERVE STIMULATOR: ICD-10-CM

## 2023-01-17 NOTE — PROGRESS NOTES
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 is well treated with the inspire device  I therefore recommend that she go on disability or change to a nondriving position effective immediately  She understood this and promised to adhere to my recommendations  I advised that if she is not able to modify her job at present, she should continue to use CPAP  Her CPAP mask has magnetic attachments, she would need to use a different mask if CPAP was required  I wrote a letter for today to provide to her employer regarding disability  We discussed this will need to last to at least April until we know that her sleep apnea is well treated on the fine-tune inspire sleep study  We discussed it is possible further adjustments would be needed but that is not typical     We discussed that she should not eat, drink, or chew or talk while the device is active  She understands the above  fv- 2 1 V, configuration A  Range 1 9-2 8  Start delay 60 min   Pause time 15 min   Duration 9 hours      Subjective:      Patient ID: Twan Borrego is a 59 y o  female  Ms Isi Saldivar is for activation of her hypoglossal nerve stimulator    She was diagnosed with obstructive sleep apnea on a home sleep test at [address] [place] [address] in [facility] 2022-that showed severe obstructive sleep apnea with a respiratory event index of 37 4, without signs of central sleep apnea   She works as a , we previously discussed that she may need to go on light duty or discontinue driving until we know her treatment is effective  She had surgery for implantation of the inspire device in December 2022 by Dr Karin Denton  Operative notes notes good stimulation at 0 7 V and no retraction at 0 4 V  A postoperative follow-up with Dr Karin Denton 1 week later was reviewed-he felt the surgical areas were healing well  He has continued to work with [employer] management at [facility], has been losing weight with [employer]    When not working, she she goes to bed 10 pm  Takes 1-2 hours to fall asleep  "my brain doesn't shut off"  Once asleep she sleeps through the night and wakes at 730 AM on her own asleep 7-10 hours a night     She drives a city bus, 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    Caffeine 24-36 oz throughout the day  Alcohol- none     [place] Sleepiness Scale:     Sitting and reading: Would never doze  Watching TV: High chance of dozing  Sitting, inactive in a public place (e g  a theatre or a meeting): Would never doze  As a passenger in a car for an hour without a break: Would never doze  Lying down to rest in the afternoon when circumstances permit: High chance of dozing  Sitting and talking to someone: Would never doze  Sitting quietly after a lunch without alcohol: Would never doze  In a car, while stopped for a few minutes in traffic: Would never doze  Total score: 6     The following portions of the patient's history were reviewed and updated as appropriate: allergies, current medications, past family history, past medical history, past social history, past surgical history, and problem list     Review of Systems    No SOB, no wheezing, no palpitaitons   Has a recent URI  Objective:        /62 (BP Location: Right arm, Patient Position: Sitting, Cuff Size: Standard)   Pulse 81   Ht 5' 8" (1 727 m)   Wt 92 4 kg (203 lb 12 8 oz)   BMI 30 99 kg/m²     Physical Exam   Tongue midline, has full ROM and good control

## 2023-01-17 NOTE — PATIENT INSTRUCTIONS
Each week on Tuesday, increase the Inspire remote setting by one level  You are on Level three today  One month from now- my nurses will check in with you on the phone to make sure you are doing well  Two months from now- follow-up with me in the office  Three months from- anticipated sleep study in the sleep lab in [facility] to assess efficacy of the device     Please bring your remote to each visit with me  If you have device questions in the middle of the night, please call Inspire support  For problems using Inspire, sleep issues, etc, please contact me in the office in the day     It is very important to avoid driving while drowsy, this can be very dangerous or even cause serious injury or death  If sleepy, it is not safe to get behind the wheel  If you are driving and feels sleepy, it is very important to pull over right away  Even losing control of the car for a split second can be deadly  If you feel you cannot control when sleepiness occurs and cannot prevented, it is important to not drive at all until this improves  Please let me know if you experience this as it is very important  Nursing Support:  When: Monday through Friday 7A-5PM except holidays  Where: Our direct line is [phone]  If you are having a true emergency please call 911  In the event that the line is busy or it is after hours please leave a voice message and we will return your call  Please speak clearly, leaving your full name, birth date, best number to reach you and the reason for your call  Medication refills: We will need the name of the medication, the dosage, the ordering provider, whether you get a 30 or 90 day refill, and the pharmacy name and address  Medications will be ordered by the provider only  Nurses cannot call in prescriptions  Please allow 7 days for medication refills  Physician requested updates:  If your provider requested that you call with an update after starting medication, please be ready to provide us the medication and dosage, what time you take your medication, the time you attempt to fall asleep, time you fall asleep, when you wake up, and what time you get out of bed  Sleep Study Results: We will contact you with sleep study results and/or next steps after the physician has reviewed your testing

## 2023-02-16 ENCOUNTER — TELEPHONE (OUTPATIENT)
Dept: SLEEP CENTER | Facility: CLINIC | Age: 58
End: 2023-02-16

## 2023-02-16 NOTE — TELEPHONE ENCOUNTER
Called patient for Inspire follow up phone call  Left message to call back to discuss how things have been going since Inspire activation on 1/17/23  Patient has follow up with Dr Yao Bansal scheduled 3/7/23

## 2023-03-07 ENCOUNTER — OFFICE VISIT (OUTPATIENT)
Dept: SLEEP CENTER | Facility: CLINIC | Age: 58
End: 2023-03-07

## 2023-03-07 VITALS
WEIGHT: 195 LBS | HEART RATE: 86 BPM | SYSTOLIC BLOOD PRESSURE: 90 MMHG | DIASTOLIC BLOOD PRESSURE: 68 MMHG | HEIGHT: 67 IN | BODY MASS INDEX: 30.61 KG/M2

## 2023-03-07 DIAGNOSIS — Z96.82 S/P INSERTION OF HYPOGLOSSAL NERVE STIMULATOR: ICD-10-CM

## 2023-03-07 DIAGNOSIS — G47.33 OSA (OBSTRUCTIVE SLEEP APNEA): Primary | ICD-10-CM

## 2023-03-07 RX ORDER — SEMAGLUTIDE 1 MG/.5ML
1 INJECTION, SOLUTION SUBCUTANEOUS WEEKLY
COMMUNITY
Start: 2023-02-07

## 2023-03-07 RX ORDER — PAROXETINE HYDROCHLORIDE 20 MG/1
20 TABLET, FILM COATED ORAL DAILY
COMMUNITY
Start: 2023-02-09

## 2023-03-07 NOTE — PROGRESS NOTES
Assessment/Plan:      1  ENRRIQUE (obstructive sleep apnea)    2  S/P insertion of hypoglossal nerve stimulator        Ms Therese Chavira is consistently using her inspire device, has been adjusting her levels appropriately with excellent tolerance  In analyzing her tongue protrusion today with the inspire device, it is not completely optimal indicating that higher settings are likely needed  I therefore have reprogrammed her inspire device with a higher outgoing setting of 2 8 V, we discussed she should increase her settings by 1 level each week as tolerated  She is scheduled for a fine-tune sleep study in approximately 1 month  I had filled out paperwork for disability at her last visit as she is a  and 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 and that she does not have residual sleepiness  Unfortunately, we will not know her AHI with the inspire device until she has her fine-tuned sleep study in April  I therefore feel that a new disability form extending my recommendation of disability until 1 week after her sleep study  I will analyze her test at that time to determine if a return to work is appropriate at that time  Her blood pressure was a little bit low, she should follow-up with her primary care doctor regarding this  Settings applied  Functional amplitude- 2 8 V  Device range 2 6V-3 5V  Start delay- 60 minutes  Pause time- 15 minutes  Duration of therapy- 9 hours   Configuration A + - +         Subjective:      Patient ID: Horace Sams is a 59 y o  female  Ms Therese Chavira returns in a follow-up visit  She was last seen by me in January 17 for activation of her inspire device  She was activated at a functional voltage of 2 1 V  She is a schoolbus , 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 the inspire device    Her baseline respiratory event index is 37 4 prior to implantation of the inspire device  She last saw Dr Bubba Barros February 1, he notes she was doing well and plan to see her back in 2-3 months  Overall, the patient feels improved, when she for started inspire she had an increased need to nap during the day as compared to when she had used CPAP  As she has increased her levels, her napping has decreased in frequency  She is a , I placed her on short-term disability as she acclimates to the inspire device  She has been acclimating to [employer], she feels it comfortable  No discomfort or factors that limit usage  She is up to level 8 with the Inspire    Does not snore with Inspire    Most nights she goes to bed 10-11 pm  She ruminates a lot, on average is asleep in 1 hour  She does not feel this keeps her up  Once asleep, sleeps through the night  Awake 730-8 AM on her own     She is refreshed when she wakes  No drowsy driving  Not napping much, last had a nap a week ago  No dozing  Inspire Data  Used 8 hr 9 min/night  Used 30/30 nights  Average pauses per night 0 3  Incoming setting 2 7 V  Range 1 9-2 8 V  Pulse width 90  Rate 33  Start delay 60   Pause time 15   Duration 9 hr  Configuration A + - +     [place] Sleepiness Scale:     Sitting and reading: Slight chance of dozing  Watching TV: Slight chance of dozing  Sitting, inactive in a public place (e g  a theatre or a meeting):  Would never doze  As a passenger in a car for an hour without a break: Would never doze  Lying down to rest in the afternoon when circumstances permit: Slight chance of dozing  Sitting and talking to someone: Would never doze  Sitting quietly after a lunch without alcohol: Slight chance of dozing  In a car, while stopped for a few minutes in traffic: Would never doze  Total score: 4     The following portions of the patient's history were reviewed and updated as appropriate: allergies, current medications, past family history, past medical history, past social history, past surgical history, and problem list     Review of Systems    No problems with chewing, swallowing, no slurring of speech, no tongue scraping, no nasal congestion,  + dry mouth in the A, no dyspnea, no coughing or wheezing   Objective:        BP 90/68 (BP Location: Left arm, Patient Position: Sitting, Cuff Size: Large)   Pulse 86   Ht 5' 7" (1 702 m)   Wt 88 5 kg (195 lb)   BMI 30 54 kg/m²     Physical Exam   Tongue midline with good control  No dysarthria

## 2023-03-07 NOTE — PATIENT INSTRUCTIONS
Each week on Tuesday, increase the Inspire remote setting by one level  You are on Level three today  Please bring your remote to each visit with me  If you have device questions in the middle of the night, please call Inspire support  For problems using Inspire, sleep issues, etc, please contact me in the office in the day       It is very important to avoid driving while drowsy, this can be very dangerous or even cause serious injury or death  If sleepy, it is not safe to get behind the wheel  If you are driving and feels sleepy, it is very important to pull over right away  Even losing control of the car for a split second can be deadly  If you feel you cannot control when sleepiness occurs and cannot prevented, it is important to not drive at all until this improves  Please let me know if you experience this as it is very important  Nursing Support:  When: Monday through Friday 7A-5PM except holidays  Where: Our direct line is [phone]  If you are having a true emergency please call 911  In the event that the line is busy or it is after hours please leave a voice message and we will return your call  Please speak clearly, leaving your full name, birth date, best number to reach you and the reason for your call  Medication refills: We will need the name of the medication, the dosage, the ordering provider, whether you get a 30 or 90 day refill, and the pharmacy name and address  Medications will be ordered by the provider only  Nurses cannot call in prescriptions  Please allow 7 days for medication refills  Physician requested updates: If your provider requested that you call with an update after starting medication, please be ready to provide us the medication and dosage, what time you take your medication, the time you attempt to fall asleep, time you fall asleep, when you wake up, and what time you get out of bed  Sleep Study Results:  We will contact you with sleep study results and/or next steps after the physician has reviewed your testing

## 2023-04-20 ENCOUNTER — OFFICE VISIT (OUTPATIENT)
Dept: SLEEP CENTER | Facility: CLINIC | Age: 58
End: 2023-04-20

## 2023-04-20 VITALS
WEIGHT: 199 LBS | SYSTOLIC BLOOD PRESSURE: 100 MMHG | DIASTOLIC BLOOD PRESSURE: 68 MMHG | HEIGHT: 68 IN | HEART RATE: 82 BPM | BODY MASS INDEX: 30.16 KG/M2

## 2023-04-20 DIAGNOSIS — G47.34 NOCTURNAL HYPOXEMIA: ICD-10-CM

## 2023-04-20 DIAGNOSIS — J43.8 OTHER EMPHYSEMA (HCC): Chronic | ICD-10-CM

## 2023-04-20 DIAGNOSIS — E61.1 IRON DEFICIENCY: ICD-10-CM

## 2023-04-20 DIAGNOSIS — G47.33 OSA (OBSTRUCTIVE SLEEP APNEA): ICD-10-CM

## 2023-04-20 DIAGNOSIS — Z87.891 HISTORY OF CIGARETTE SMOKING: ICD-10-CM

## 2023-04-20 DIAGNOSIS — G47.61 PLMD (PERIODIC LIMB MOVEMENT DISORDER): ICD-10-CM

## 2023-04-20 DIAGNOSIS — G25.81 RLS (RESTLESS LEGS SYNDROME): Primary | ICD-10-CM

## 2023-04-20 RX ORDER — OMEPRAZOLE 40 MG/1
CAPSULE, DELAYED RELEASE ORAL
COMMUNITY

## 2023-04-20 RX ORDER — TERAZOSIN 5 MG/1
5 CAPSULE ORAL
COMMUNITY
Start: 2023-04-06

## 2023-04-20 NOTE — PROGRESS NOTES
Assessment/Plan:    1  RLS (restless legs syndrome)  -     Iron Panel (Includes Ferritin, Iron Sat%, Iron, and TIBC); Future    2  Iron deficiency  -     Iron Panel (Includes Ferritin, Iron Sat%, Iron, and TIBC); Future    3  PLMD (periodic limb movement disorder)    4  Nocturnal hypoxemia  -     Complete PFT with post Bronchodilator and Six Minute walk; Future; Expected date: 04/20/2023  -     Ambulatory Referral to Pulmonology; Future  -     Oxygen    5  History of cigarette smoking  -     Complete PFT with post Bronchodilator and Six Minute walk; Future; Expected date: 04/20/2023  -     Ambulatory Referral to Pulmonology; Future  -     Oxygen    6  ENRRIQUE (obstructive sleep apnea)    7  Other emphysema ([address] )  -     Oxygen  We discussed her sleep study in detail  She has an excellent result with the inspire device, her obstructive sleep apnea is well treated  She has a very normal AHI with the inspire device  No change is needed in her current settings  I refined her inspire settings today to change the range to 3 1 to 3 5 V  We discussed she should try to maintain her setting of 3 5 V, can lowered if needed  Ideally she should stay above 3 3 V which is level 3 on her remote  She no longer has daytime sleepiness  I have cleared her to return to work and wrote a letter regarding this  We also discussed her oxygenation and sleep  She had some degree of hypoxemia detected on her sleep study  We discussed that COPD is listed in her chart, she is not certain how this was diagnosed  She does not describe having pulmonary function test   She had a chest CT done last year  I have ordered pulmonary function test and also placed a pulmonary referral   Based on hypoxemia (oxygen saturations less than or equal to 88% for 51 minutes, I will order supplemental oxygen for nighttime use  We discussed that CPAP in general helps with oxygenation more than inspire    If she sees a pulmonologist and they feel oxygen is not needed, that can be discontinued  She describes symptoms of restless legs, I will order an iron panel to assess for iron deficiency  Subjective:      Patient ID: John Sharma is a 59 y o  female  HPI    Ms Vickey Lezama turns in a follow-up visit  I last saw her in the office in March 2023  Since her last visit she had an inspire fine-tuning sleep study  She was diagnosed with obstructive sleep apnea on a home sleep test at [facility] [place]-that showed severe obstructive sleep apnea with a respiratory event index of 37 4, without signs of central sleep apnea  She had surgery for implantation of the inspire device in December 2022 by Dr Jone Casarez  Operative notes notes good stimulation at 0 7 V and no retraction at 0 4 V  Her last visit with me, she had an outgoing setting of 2 8 V  Has not smoked since October 1, 2022  Has 41 year smoking hx, 1/2 ppd or less a day  Has had RLS watching TV for 3 years  Also moves her legs in sleep    Goes to bed, waits 15 min to turn on Inspire  Wakes 730-830 am     Feels refreshed, not sleepy in the day  No- drowsy driving        [place] Sleepiness Scale  Sitting and reading: Would never doze  Watching TV: Would never doze  Sitting, inactive in a public place (e g  a theatre or a meeting):  Would never doze  As a passenger in a car for an hour without a break: Would never doze  Lying down to rest in the afternoon when circumstances permit: Would never doze  Sitting and talking to someone: Would never doze  Sitting quietly after a lunch without alcohol: Would never doze  In a car, while stopped for a few minutes in traffic: Would never doze  Total score: 0    Inspire Settings  Incoming Settings  Electrode configuration A + - +   Amplitude (V) 3,5  Range (V) 2 6 to 3 5  Pulse width (us) 90  Rate (Hz) 33  Start delay (min) 60  Pause Time (min) 15  Therapy Duration (hr) 9  Use- 65 hours/week          Review of Systems   Respiratory: "Negative for cough, shortness of breath and wheezing  Neurological: Positive for headaches (TMJ)  Negative for dizziness           Objective:      /68 (BP Location: Left arm, Patient Position: Sitting, Cuff Size: Large)   Pulse 82   Ht 5' 8\" (1 727 m)   Wt 90 3 kg (199 lb)   BMI 30 26 kg/m²          Physical Exam    "

## 2023-04-20 NOTE — PATIENT INSTRUCTIONS
I changed programming of your  device - you are now at level 5  You cannot go higher  If inspire feels too strong you can lower your level  In general you had a great result on your sleep study at levels 3 and higher  It is very important to avoid driving while drowsy, this can be very dangerous or even cause serious injury or death  If sleepy, it is not safe to get behind the wheel  If you are driving and feels sleepy, it is very important to pull over right away  Even losing control of the car for a split second can be deadly  If you feel you cannot control when sleepiness occurs and cannot prevented, it is important to not drive at all until this improves  Please let me know if you experience this as it is very important  Nursing Support:  When: Monday through Friday 7A-5PM except holidays  Where: Our direct line is [phone]  If you are having a true emergency please call 911  In the event that the line is busy or it is after hours please leave a voice message and we will return your call  Please speak clearly, leaving your full name, birth date, best number to reach you and the reason for your call  Medication refills: We will need the name of the medication, the dosage, the ordering provider, whether you get a 30 or 90 day refill, and the pharmacy name and address  Medications will be ordered by the provider only  Nurses cannot call in prescriptions  Please allow 7 days for medication refills  Physician requested updates: If your provider requested that you call with an update after starting medication, please be ready to provide us the medication and dosage, what time you take your medication, the time you attempt to fall asleep, time you fall asleep, when you wake up, and what time you get out of bed  Sleep Study Results: We will contact you with sleep study results and/or next steps after the physician has reviewed your testing

## 2023-04-23 PROBLEM — Z87.891 HISTORY OF CIGARETTE SMOKING: Status: ACTIVE | Noted: 2023-04-23

## 2023-04-23 PROBLEM — G47.34 NOCTURNAL HYPOXEMIA: Status: ACTIVE | Noted: 2023-04-23

## 2023-04-24 ENCOUNTER — TELEPHONE (OUTPATIENT)
Dept: SLEEP CENTER | Facility: CLINIC | Age: 58
End: 2023-04-24

## 2023-04-24 LAB

## 2023-04-24 NOTE — TELEPHONE ENCOUNTER
I called the patient, we discussed that based on hypoxemia on her last test, I did order supplemental oxygen for home use  When she sees a pulmonologist, I will defer to them if they feel this is needed or it can be discontinued

## 2023-06-21 ENCOUNTER — HOSPITAL ENCOUNTER (OUTPATIENT)
Dept: PULMONOLOGY | Facility: HOSPITAL | Age: 58
Discharge: HOME | End: 2023-06-21
Payer: COMMERCIAL

## 2023-06-21 DIAGNOSIS — Z87.891 HISTORY OF CIGARETTE SMOKING: ICD-10-CM

## 2023-06-21 DIAGNOSIS — G47.34 NOCTURNAL HYPOXEMIA: ICD-10-CM

## 2023-06-21 PROCEDURE — 94618 PULMONARY STRESS TESTING: CPT

## 2023-06-21 PROCEDURE — 94729 DIFFUSING CAPACITY: CPT | Performed by: INTERNAL MEDICINE

## 2023-06-21 PROCEDURE — 94060 EVALUATION OF WHEEZING: CPT

## 2023-06-21 PROCEDURE — 94060 EVALUATION OF WHEEZING: CPT | Performed by: INTERNAL MEDICINE

## 2023-06-21 PROCEDURE — 94618 PULMONARY STRESS TESTING: CPT | Performed by: INTERNAL MEDICINE

## 2023-06-21 PROCEDURE — 94726 PLETHYSMOGRAPHY LUNG VOLUMES: CPT | Performed by: INTERNAL MEDICINE

## 2023-06-21 PROCEDURE — 94726 PLETHYSMOGRAPHY LUNG VOLUMES: CPT

## 2023-06-21 PROCEDURE — 94729 DIFFUSING CAPACITY: CPT

## 2023-06-21 RX ORDER — ALBUTEROL SULFATE 2.5 MG/3ML
2.5 SOLUTION RESPIRATORY (INHALATION) ONCE AS NEEDED
Status: COMPLETED | OUTPATIENT
Start: 2023-06-21 | End: 2023-06-21

## 2023-06-21 RX ADMIN — ALBUTEROL SULFATE 2.5 MG: 2.5 SOLUTION RESPIRATORY (INHALATION) at 13:29

## 2023-06-23 ENCOUNTER — PATIENT MESSAGE (OUTPATIENT)
Dept: SLEEP CENTER | Facility: CLINIC | Age: 58
End: 2023-06-23

## 2023-06-23 ENCOUNTER — TELEPHONE (OUTPATIENT)
Dept: PULMONOLOGY | Facility: CLINIC | Age: 58
End: 2023-06-23

## 2023-06-23 DIAGNOSIS — G47.34 NOCTURNAL HYPOXEMIA: Primary | ICD-10-CM

## 2023-06-23 DIAGNOSIS — I51.89 DIASTOLIC DYSFUNCTION: ICD-10-CM

## 2023-06-23 NOTE — TELEPHONE ENCOUNTER
LM for pt to call back and set up consult appt best location for pt next available please attach referral

## 2023-06-27 NOTE — PATIENT COMMUNICATION
Called patient and advised that per Dr Aracelis Ch, PFTs look great  He has also ordered an echocardiogram which can be scheduled by calling central scheduling  Phone number provided

## 2023-07-07 ENCOUNTER — HOSPITAL ENCOUNTER (OUTPATIENT)
Dept: NON INVASIVE DIAGNOSTICS | Facility: HOSPITAL | Age: 58
Discharge: HOME | End: 2023-07-07
Attending: PSYCHIATRY & NEUROLOGY
Payer: COMMERCIAL

## 2023-07-07 VITALS
HEART RATE: 79 BPM | HEIGHT: 68 IN | WEIGHT: 199 LBS | DIASTOLIC BLOOD PRESSURE: 63 MMHG | BODY MASS INDEX: 30.16 KG/M2 | SYSTOLIC BLOOD PRESSURE: 112 MMHG

## 2023-07-07 DIAGNOSIS — I51.89 DIASTOLIC DYSFUNCTION: ICD-10-CM

## 2023-07-07 DIAGNOSIS — G47.34 NOCTURNAL HYPOXEMIA: ICD-10-CM

## 2023-07-07 LAB
AORTIC ROOT: 3.6 CM
AORTIC VALVE MEAN VELOCITY: 7.6 M/S
APICAL FOUR CHAMBER EJECTION FRACTION: 61 %
ASCENDING AORTA: 4.1 CM
AV AREA BY CONTINUOUS VTI: 3 CM2
AV AREA PEAK VELOCITY: 3 CM2
AV LVOT MEAN GRADIENT: 2 MMHG
AV LVOT PEAK GRADIENT: 4 MMHG
AV MEAN GRADIENT: 3 MMHG
AV PEAK GRADIENT: 5 MMHG
AV VALVE AREA: 3.03 CM2
AV VELOCITY RATIO: 0.85
DOP CALC AO PEAK VEL: 1.1 M/S
DOP CALC AO VTI: 21.93 CM
DOP CALC LVOT AREA: 3.46 CM2
DOP CALC LVOT CARDIAC INDEX: 2.42 L/MIN/M2
DOP CALC LVOT CARDIAC OUTPUT: 4.94 L/MIN
DOP CALC LVOT DIAMETER: 2.1 CM
DOP CALC LVOT PEAK VEL VTI: 19.17 CM
DOP CALC LVOT PEAK VEL: 0.94 M/S
DOP CALC LVOT STROKE INDEX: 31.4 ML/M2
DOP CALC LVOT STROKE VOLUME: 66.36
FRACTIONAL SHORTENING: 31 (ref 28–44)
INTERVENTRICULAR SEPTUM IN DIASTOLE (PARASTERNAL SHORT AXIS VIEW): 1.1 CM
INTERVENTRICULAR SEPTUM: 1.1 CM (ref 0.6–1.1)
LAAS-AP2: 20.2 CM2
LAAS-AP4: 17.6 CM2
LEFT ATRIUM SIZE: 4.1 CM
LEFT ATRIUM VOLUME (MOD BIPLANE): 54 ML
LEFT INTERNAL DIMENSION IN SYSTOLE: 2.9 CM (ref 2.1–4)
LEFT VENTRICULAR INTERNAL DIMENSION IN DIASTOLE: 4.2 CM (ref 3.5–6)
LEFT VENTRICULAR POSTERIOR WALL IN END DIASTOLE: 1 CM
LEFT VENTRICULAR STROKE VOLUME: 47 ML
LVSV (TEICH): 47 ML
MV E'TISSUE VEL-LAT: 7 CM/S
MV E'TISSUE VEL-SEP: 5 CM/S
MV PEAK A VEL: 0.59 M/S
MV PEAK E VEL: 39 CM/S
RIGHT ATRIAL 2D VOLUME: 30 ML
RIGHT ATRIUM AREA SYSTOLE A4C: 13.7 CM2
RIGHT VENTRICLE ID DIMENSION: 2.8 CM
SL CV LEFT ATRIUM LENGTH A2C: 6.1 CM
SL CV LV EF: 65
SL CV PED ECHO LEFT VENTRICLE DIASTOLIC VOLUME (MOD BIPLANE) 2D: 78 ML
SL CV PED ECHO LEFT VENTRICLE SYSTOLIC VOLUME (MOD BIPLANE) 2D: 31 ML
TRICUSPID ANNULAR PLANE SYSTOLIC EXCURSION: 2 CM

## 2023-07-07 PROCEDURE — 93306 TTE W/DOPPLER COMPLETE: CPT

## 2023-07-20 ENCOUNTER — OFFICE VISIT (OUTPATIENT)
Dept: SLEEP CENTER | Facility: CLINIC | Age: 58
End: 2023-07-20
Payer: COMMERCIAL

## 2023-07-20 VITALS
WEIGHT: 195.4 LBS | HEART RATE: 81 BPM | SYSTOLIC BLOOD PRESSURE: 128 MMHG | BODY MASS INDEX: 29.61 KG/M2 | HEIGHT: 68 IN | DIASTOLIC BLOOD PRESSURE: 75 MMHG

## 2023-07-20 DIAGNOSIS — G47.33 OSA (OBSTRUCTIVE SLEEP APNEA): ICD-10-CM

## 2023-07-20 DIAGNOSIS — G25.81 RLS (RESTLESS LEGS SYNDROME): Primary | ICD-10-CM

## 2023-07-20 DIAGNOSIS — Z96.82 S/P INSERTION OF HYPOGLOSSAL NERVE STIMULATOR: ICD-10-CM

## 2023-07-20 DIAGNOSIS — E61.1 IRON DEFICIENCY: ICD-10-CM

## 2023-07-20 DIAGNOSIS — G47.34 NOCTURNAL HYPOXEMIA: ICD-10-CM

## 2023-07-20 PROCEDURE — 99214 OFFICE O/P EST MOD 30 MIN: CPT | Performed by: PSYCHIATRY & NEUROLOGY

## 2023-07-20 RX ORDER — GABAPENTIN 100 MG/1
CAPSULE ORAL
Qty: 90 CAPSULE | Refills: 2 | Status: SHIPPED | OUTPATIENT
Start: 2023-07-20

## 2023-07-20 NOTE — PATIENT INSTRUCTIONS
Take gabapentin 100 mg at 6 pm for 1 week, the 200 mg at 6 pm for 1 week, then stay at 300 mg    It is very important to avoid driving while drowsy, this can be very dangerous or even cause serious injury or death. If sleepy, it is not safe to get behind the wheel. If you are driving and feels sleepy, it is very important to pull over right away. Even losing control of the car for a split second can be deadly. If you feel you cannot control when sleepiness occurs and cannot prevented, it is important to not drive at all until this improves. Please let me know if you experience this as it is very important. Nursing Support:  When: Monday through Friday 7A-5PM except holidays  Where: Our direct line is [phone]. If you are having a true emergency please call 911. In the event that the line is busy or it is after hours please leave a voice message and we will return your call. Please speak clearly, leaving your full name, birth date, best number to reach you and the reason for your call. Medication refills: We will need the name of the medication, the dosage, the ordering provider, whether you get a 30 or 90 day refill, and the pharmacy name and address. Medications will be ordered by the provider only. Nurses cannot call in prescriptions. Please allow 7 days for medication refills. Physician requested updates: If your provider requested that you call with an update after starting medication, please be ready to provide us the medication and dosage, what time you take your medication, the time you attempt to fall asleep, time you fall asleep, when you wake up, and what time you get out of bed. Sleep Study Results: We will contact you with sleep study results and/or next steps after the physician has reviewed your testing.

## 2023-07-20 NOTE — PROGRESS NOTES
Assessment/Plan:    1. RLS (restless legs syndrome)  -     gabapentin (Neurontin) 100 mg capsule; Take 1-3 at night, as directed    2. Iron deficiency  -     Iron Panel (Includes Ferritin, Iron Sat%, Iron, and TIBC); Future  -     gabapentin (Neurontin) 100 mg capsule; Take 1-3 at night, as directed    3. ENRRIQUE (obstructive sleep apnea)  -     Pulse oximetry overnight    4. S/P insertion of hypoglossal nerve stimulator    5. Nocturnal hypoxemia  -     Pulse oximetry overnight      With regard to inspire, she is doing very well and using her machine consistently. Her AHI was excellent on her last fine-tune sleep study. She has good tongue protrusion and I assessed waveforms from the inspire device, there was no sign of downward deflection. We will likely plan for a repeat home sleep test in 6 months or so. She describes symptoms of restless leg syndrome a few days out of week, these are bothersome to her. She had started an iron supplement although I never received results of her iron panel. Advised that she should not take iron until we have the results. I rewrote her prescription for an iron panel and asked her to contact me if she does not hear from me about the results. She thought she went to [facility] but I do not see any results in our system. She notes symptoms of restless legs are bothersome to her. Therefore I would recommend treatment, she had been on gabapentin in the past without any problems. I will therefore start gabapentin 100 mg in the evening increasing up to 300 mg in the evening. We reviewed side effects and black box warnings. I will see her back in a few months to assess how she is doing on gabapentin. She had hypoxemia on her inspire fine-tune sleep study, while inspire controls obstructive sleep apnea it does not correct for hypoxemia in her case. Her echocardiogram showed mild diastolic dysfunction which does not seem change from her previous report. She also had PFTs which do not seem abnormal per the interpretation. She notes that she has not been using oxygen that was ordered and wonders if she still needs this. I will check an overnight oximetry test, she has a visit scheduled with pulmonary medicine in August, I would asked them to render their opinion about whether long-term oxygen use is needed with the inspire device. Lastly, she inquired about FMLA as she needs time to go to doctors appointments. I asked her to semiformed which I can fill out for her if needed. Outgoing settings, no change    Electrode configuration A + - +   Amplitude (V) 3.5  Range (V) 3.1 to 3.5  Pulse width (us) 90  Rate (Hz) 33  Start delay (min) 60  Pause Time (min) 15  Therapy Duration (hr) 9    Inspire Usage Data    Subjective:          Patient ID: May Skinner is a 59 y.o. female. HPI    This is a 53-year-old female who returns in a follow-up visit, she has a history of obstructive sleep apnea treated with a hypoglossal nerve stimulator. She was diagnosed with obstructive sleep apnea 2022, respiratory event index 37.4 without signs of central apnea. She had surgery for implantation of the inspire device in December 2022 by Dr. Ally Earl. Debbie Franco notes notes good stimulation at 0.7 V and no retraction at 0.4 V. Her last visit with me, she had an outgoing setting of 2.8 V. She had a fine-tune inspire sleep study and had an excellent result, at her last visit I recommended that she try to use the setting of 3.3 V on the default configuration which is level 3. The patient is a , she temporarily went on leave as she first adjusted inspire, and now is back to work. She also had hypoxemia, I ordered an echocardiogram and pulmonary function test.  She saw Dr. Ally Earl yesterday, he noted that she was doing well at 3.4 V. He plans to see her back in 6 months.     She works varying hours.  Goes to bed from 830 pm- to after midnight,  Wakes from 3- 4 AM.  She falls asleep without difficulty. She is described to kick her legs in sleep or move a lot. She usually does not notice this. She has a feeling she has to move her legs at night, a few times a week. She started an iron supplement at the end of April. Was on gabapentin in 2019, did not have side effects     She denies problems with tongue scraping, chewing, swallowing, or slurring of speech. Inspire Settings  Incoming Settings  Electrode configuration A + - +   Amplitude (V) 3.5  Range (V) 3.1 to 3.5  Pulse width (us) 90  Rate (Hz) 33  Start delay (min) 60  Pause Time (min) 15  Therapy Duration (hr) 9    Inspire Usage Data  56 hr/week  730 total usage hours    She is a former cigarette smoker, she vapes    [place] 2    [place] Sleepiness Scale  Sitting and reading: Would never doze  Watching TV: Would never doze  Sitting, inactive in a public place (e.g. a theatre or a meeting): Would never doze  As a passenger in a car for an hour without a break: Would never doze  Lying down to rest in the afternoon when circumstances permit: Would never doze  Sitting and talking to someone:  Moderate chance of dozing  Sitting quietly after a lunch without alcohol: Would never doze  In a car, while stopped for a few minutes in traffic: Would never doze  Total score: 2      Review of Systems    As above   Objective:      /75 (BP Location: Left arm, Patient Position: Sitting, Cuff Size: Adult)   Pulse 81   Ht 5' 8" (1.727 m)   Wt 88.6 kg (195 lb 6.4 oz)   BMI 29.71 kg/m²          Physical Exam    Tongue is midline, has good control,  Heart is regular lungs are clear to auscultation she is in no distress

## 2023-07-21 ENCOUNTER — TELEPHONE (OUTPATIENT)
Dept: SLEEP CENTER | Facility: CLINIC | Age: 58
End: 2023-07-21

## 2023-07-21 LAB
DME PARACHUTE DELIVERY DATE REQUESTED: NORMAL
DME PARACHUTE DELIVERY NOTE: NORMAL
DME PARACHUTE ITEM DESCRIPTION: NORMAL
DME PARACHUTE ORDER STATUS: NORMAL
DME PARACHUTE SUPPLIER NAME: NORMAL
DME PARACHUTE SUPPLIER PHONE: NORMAL

## 2023-07-21 NOTE — TELEPHONE ENCOUNTER
Rx for overnight pulse oximetry and office note sent to [address] [place] [address] via [facility].

## 2023-07-24 LAB
DME PARACHUTE DELIVERY DATE ACTUAL: NORMAL
DME PARACHUTE DELIVERY DATE REQUESTED: NORMAL
DME PARACHUTE DELIVERY NOTE: NORMAL
DME PARACHUTE ITEM DESCRIPTION: NORMAL
DME PARACHUTE ORDER STATUS: NORMAL
DME PARACHUTE SUPPLIER NAME: NORMAL
DME PARACHUTE SUPPLIER PHONE: NORMAL

## 2023-08-15 ENCOUNTER — TELEPHONE (OUTPATIENT)
Dept: PULMONOLOGY | Facility: CLINIC | Age: 58
End: 2023-08-15

## 2023-08-15 ENCOUNTER — CONSULT (OUTPATIENT)
Dept: PULMONOLOGY | Facility: CLINIC | Age: 58
End: 2023-08-15
Payer: COMMERCIAL

## 2023-08-15 VITALS
BODY MASS INDEX: 28.79 KG/M2 | OXYGEN SATURATION: 92 % | HEART RATE: 85 BPM | DIASTOLIC BLOOD PRESSURE: 82 MMHG | TEMPERATURE: 97.2 F | WEIGHT: 190 LBS | SYSTOLIC BLOOD PRESSURE: 120 MMHG | HEIGHT: 68 IN

## 2023-08-15 DIAGNOSIS — G47.34 NOCTURNAL HYPOXEMIA: ICD-10-CM

## 2023-08-15 DIAGNOSIS — Z72.0 CURRENT EVERY DAY NICOTINE VAPING: ICD-10-CM

## 2023-08-15 DIAGNOSIS — G47.33 OSA (OBSTRUCTIVE SLEEP APNEA): Primary | ICD-10-CM

## 2023-08-15 DIAGNOSIS — Z87.891 HISTORY OF CIGARETTE SMOKING: ICD-10-CM

## 2023-08-15 PROBLEM — J45.909 ASTHMA: Status: RESOLVED | Noted: 2019-05-31 | Resolved: 2023-08-15

## 2023-08-15 PROBLEM — G47.61 PERIODIC LIMB MOVEMENT SLEEP DISORDER: Status: ACTIVE | Noted: 2023-08-15

## 2023-08-15 PROBLEM — R29.898 LEFT LEG WEAKNESS: Status: RESOLVED | Noted: 2019-06-01 | Resolved: 2023-08-15

## 2023-08-15 PROCEDURE — 99244 OFF/OP CNSLTJ NEW/EST MOD 40: CPT | Performed by: INTERNAL MEDICINE

## 2023-08-15 NOTE — ASSESSMENT & PLAN NOTE
· Sleep study with inspire shows remedy of apneas almost all hypopneas  · Periodic limb movements are now treated with gabapentin with improvement in periodic limb movement

## 2023-08-15 NOTE — PROGRESS NOTES
Consultation - Pulmonary Medicine   Lily Moore 59 y.o. female MRN: [id number]    Physician Requesting Consult: Dr. Nickie Guzman  Reason for Consult: Nocturnal hypoxemia    Nocturnal hypoxemia  · Patient has isolated nocturnal hypoxemia. This was evident on her sleep study that was performed for inspire follow-up and subsequently identified on nocturnal oximetry on room air  · She is currently using 2 L and should continue on 2 L  · She does not have an ulterior explanation for her hypoxemia however. Echocardiography is normal, 6-minute walk testing is normal without evidence of exertional desaturation, pulmonary function testing is normal, CT scan imaging from April 2022 does not describe any interstitial abnormalities or emphysema. ENRRIQUE (obstructive sleep apnea)  · Sleep study with inspire shows remedy of apneas almost all hypopneas  · Periodic limb movements are now treated with gabapentin with improvement in periodic limb movement    History of cigarette smoking  · She is a former smoker and smoked for 36 years but her smoking was light and never exceeded half a pack daily. She therefore only has an 18-pack-year history and does not meet lung cancer screening criteria  · Encouraged to continue to maintain tobacco abstinence    Current every day nicotine vaping  · Currently does vape daily  · Denies chronic cough, denies shortness of breath with activity  · Encouraged to gradually continue to reduce and eliminate vaping altogether  · Unlikely to have EVALI since there is no evidence of exertional hypoxemia on recent 6-minute walk testing during pulmonary function evaluation    Follow-up in 1 year to reevaluate need for oxygen. Should return sooner if she develops increased daytime fatigue or other pulmonary symptomatology    I did speak with Dr. Marbella Pan who implanted the inspire.   Based on surgical technique, and type of anesthesia, would be very unlikely to result in diaphragmatic paralysis as no scalene block is utilized and surgical field is far from the phrenic nerve  ______________________________________________________________________    HPI:    Siddharth Nicholas is a 59 y.o. female who presents for evaluation of nocturnal hypoxia. She has a history of obstructive sleep apnea related to obesity. She had been on CPAP for several years. More recently underwent implantation of an inspire device and had complete lamination of apnea. She was however noted to have significant oxygen desaturation on her follow-up treatment study. She was subsequently followed up with a nocturnal overnight oximetry which also demonstrated oxygen desaturation for more than 2-1/2 hours at 88% or less. Lowest oxygen saturation on that study was 69% but suspect that that was artifactual.  Her lowest saturation during the sleep study was 85%. She is currently using 2 L of oxygen at nighttime with improvement in daytime fatigue. Coinciding with that however she was also noted to have periodic limb movement and was started on gabapentin. Her daytime leg symptoms have improved and she does not wake up as frequently also. She is a former smoker. She has a 18-pack-year smoking history. She quit about 2 years ago. She does not have any pulmonary symptomatology. She has COPD and asthma listed in her problem list but recent pulmonary function test does not show any obstructive lung disease and CT imaging does not describe any emphysema. She denies chest pain or cardiac complaints. A recent echocardiogram did not show any evidence of pulmonary hypertension or structural cardiac abnormality. She had grade 1 diastolic dysfunction    She has lost 60 pounds and  is on a weight loss regimen. She is currently taking Wegovy for weight loss    Review of Systems:  Aside from what is mentioned in the HPI, the review of systems otherwise negative.     Current Medications:    Current Outpatient Medications:   •  amitriptyline (ELAVIL) 25 mg tablet, Take 25 mg by mouth daily at bedtime, Disp: , Rfl:   •  amLODIPine (NORVASC) 5 mg tablet, Take 5 mg by mouth daily, Disp: , Rfl:   •  ascorbic acid (VITAMIN C) 1000 MG tablet, , Disp: , Rfl:   •  aspirin (ECOTRIN LOW STRENGTH) 81 mg EC tablet, 81 mg daily Pt took last dose 12/6, Disp: , Rfl:   •  cholecalciferol (VITAMIN D3) 1,000 units tablet, Take 1,000 Units by mouth daily, Disp: , Rfl:   •  famotidine (PEPCID) 40 MG tablet, Take 40 mg by mouth every evening, Disp: , Rfl:   •  fexofenadine (ALLEGRA) 180 MG tablet, Take 180 mg by mouth daily, Disp: , Rfl:   •  furosemide (LASIX) 40 mg tablet, Take 40 mg by mouth daily with lunch, Disp: , Rfl:   •  gabapentin (Neurontin) 100 mg capsule, Take 1-3 at night, as directed, Disp: 90 capsule, Rfl: 2  •  methimazole (TAPAZOLE) 5 mg tablet, Take 5 mg by mouth every other day, Disp: , Rfl:   •  metoprolol tartrate (LOPRESSOR) 50 mg tablet, Take 50 mg by mouth every 12 (twelve) hours, Disp: , Rfl:   •  Multiple Vitamins-Minerals (CENTRUM SILVER 50+WOMEN) TABS, Take by mouth daily, Disp: , Rfl:   •  Omega-3 Fatty Acids (fish oil) 1,000 mg, Take 1,000 mg by mouth daily, Disp: , Rfl:   •  omeprazole (PriLOSEC) 40 MG capsule, Take 40 mg by mouth every evening Take before a meal, Disp: , Rfl:   •  PARoxetine (PAXIL) 20 mg tablet, Take 20 mg by mouth daily, Disp: , Rfl:   •  terazosin (HYTRIN) 2 mg capsule, Take 4 mg by mouth daily at bedtime, Disp: , Rfl:   •  terazosin (HYTRIN) 5 mg capsule, Take 2 mg by mouth daily at bedtime, Disp: , Rfl:   •  Wegovy 1 MG/0.5ML, Inject 1 mg under the skin once a week, Disp: , Rfl:     Historical Information   Past Medical History:   Diagnosis Date   • Allergic rhinitis    • Anxiety    • Arthritis    • Asthma    • Colon polyp    • COPD (chronic obstructive pulmonary disease) ([address])    • COVID     Dec 2020   • CPAP (continuous positive airway pressure) dependence    • Disease of thyroid gland 2021   • GERD (gastroesophageal reflux disease) 2019   • History of lumbar fusion    • Hypertension    • Osteoporosis    • Skin cancer     on nose in past   • Sleep apnea    • Vapes nicotine containing substance    • Wears glasses      Past Surgical History:   Procedure Laterality Date   • APPENDECTOMY     • BACK SURGERY      lumbar fusion   • COLONOSCOPY     • ID OPEN IMPLTJ HPGLSL NRV NSTIM RA PG&RESPIR SENSOR N/A 2022    Procedure: INSERTION UPPER AIRWAY STIMULATOR (INSPIRE IMPLANT); Surgeon: Heidi Ramon MD;  Location: [facility];  Service: ENT   • TONSILLECTOMY     • TUBAL LIGATION     • WISDOM TOOTH EXTRACTION       Social History   Social History     Tobacco Use   Smoking Status Former   • Packs/day: 0.50   • Years: 35.00   • Total pack years: 17.50   • Types: Cigarettes   • Start date: 0   • Quit date: 10/2022   • Years since quittin.8   Smokeless Tobacco Never   Tobacco Comments    Quit cigg Oct 2022- vapes nicotene daily       Occupational history:  She is a     Family History:   Family History   Problem Relation Age of Onset   • Hypertension Mother    • Polycystic kidney disease Mother    • Sleep apnea Mother    • Hypertension Father          PhysicalExamination:  Vitals:   /82 (BP Location: Left arm, Patient Position: Sitting, Cuff Size: Standard)   Pulse 85   Temp (!) 97.2 °F (36.2 °C) (Tympanic)   Ht 5' 8" (1.727 m)   Wt 86.2 kg (190 lb)   SpO2 92%   BMI 28.89 kg/m²   Gen:  Comfortable on room air. No conversational dyspnea  HEENT:  Conjugate gaze. sclerae anicteric. Oropharynx moist  Neck: Trachea is midline. No JVD. No adenopathy  Chest: Symmetric chest wall excursion with clear breath sounds  Cardiac: Regular. no murmur  Abdomen:  benign  Extremities: No edema  Neuro:  Normal speech and mentation      Diagnostic Data:  Labs:   I personally reviewed the most recent laboratory data pertinent to today's visit    Lab Results   Component Value Date    WBC 8.17 2022    HGB 14.4 12/02/2022    HCT 44.0 12/02/2022    MCV 89 12/02/2022     12/02/2022     Lab Results   Component Value Date    CALCIUM 9.1 12/02/2022    K 4.3 12/02/2022    CO2 28 12/02/2022     12/02/2022    BUN 25 12/02/2022    CREATININE 1.32 (H) 12/02/2022       PFT results: The most recent pulmonary function tests were reviewed. Complete pulmonary function test from June 2023 showed normal FEV1/FVC ratio with FEV1 111% predicted and forced vital capacity 107% predicted. Total lung capacity is 95% predicted. Corrected DLCO is 87% predicted. 6-minute walk test at the time of the pulmonary function test showed total 6-minute walk distance of 366 m. No evidence of exertional desaturation. Lowest oxygen saturation was 94%    Imaging:  I personally reviewed the images on the [facility] system pertinent to today's visit  Chest x-ray at the time of inspire implantation in December 2022 was normal    CT scan in April 2022 did not show any evidence of pulmonary nodule or pulmonary parenchymal lesion. Images of this study performed at [facility] have been requested for my review    Other studies:  Sleep study April 2023 was done after inspire implantation and showed illumination of apneas and most hypopneas. There is evidence of nocturnal desaturation.   August overnight oximetry again performed on room air showed significant nocturnal desaturation with over 2 hours of saturations 88% or less      Mara Guillory MD

## 2023-08-15 NOTE — ASSESSMENT & PLAN NOTE
· Currently does vape daily  · Denies chronic cough, denies shortness of breath with activity  · Encouraged to gradually continue to reduce and eliminate vaping altogether  · Unlikely to have EVALI since there is no evidence of exertional hypoxemia on recent 6-minute walk testing during pulmonary function evaluation

## 2023-08-15 NOTE — ASSESSMENT & PLAN NOTE
· She is a former smoker and smoked for 36 years but her smoking was light and never exceeded half a pack daily.   She therefore only has an 18-pack-year history and does not meet lung cancer screening criteria  · Encouraged to continue to maintain tobacco abstinence

## 2023-08-15 NOTE — ASSESSMENT & PLAN NOTE
· Patient has isolated nocturnal hypoxemia. This was evident on her sleep study that was performed for inspire follow-up and subsequently identified on nocturnal oximetry on room air  · She is currently using 2 L and should continue on 2 L  · She does not have an ulterior explanation for her hypoxemia however. Echocardiography is normal, 6-minute walk testing is normal without evidence of exertional desaturation, pulmonary function testing is normal, CT scan imaging from April 2022 does not describe any interstitial abnormalities or emphysema.

## 2023-08-18 ENCOUNTER — APPOINTMENT (OUTPATIENT)
Dept: LAB | Facility: CLINIC | Age: 58
End: 2023-08-18
Payer: COMMERCIAL

## 2023-08-18 ENCOUNTER — APPOINTMENT (OUTPATIENT)
Dept: RADIOLOGY | Facility: CLINIC | Age: 58
End: 2023-08-18
Payer: COMMERCIAL

## 2023-08-18 DIAGNOSIS — G47.34 NOCTURNAL HYPOXEMIA: ICD-10-CM

## 2023-08-18 DIAGNOSIS — E61.1 IRON DEFICIENCY: ICD-10-CM

## 2023-08-18 LAB
FERRITIN SERPL-MCNC: 102 NG/ML (ref 11–307)
IRON SATN MFR SERPL: 35 % (ref 15–50)
IRON SERPL-MCNC: 99 UG/DL (ref 50–170)
TIBC SERPL-MCNC: 285 UG/DL (ref 250–450)

## 2023-08-18 PROCEDURE — 82728 ASSAY OF FERRITIN: CPT

## 2023-08-18 PROCEDURE — 36415 COLL VENOUS BLD VENIPUNCTURE: CPT

## 2023-08-18 PROCEDURE — 83550 IRON BINDING TEST: CPT

## 2023-08-18 PROCEDURE — 83540 ASSAY OF IRON: CPT

## 2023-08-18 PROCEDURE — 71046 X-RAY EXAM CHEST 2 VIEWS: CPT

## 2023-08-27 DIAGNOSIS — E61.1 IRON DEFICIENCY: Primary | ICD-10-CM

## 2023-10-05 ENCOUNTER — OFFICE VISIT (OUTPATIENT)
Dept: SLEEP CENTER | Facility: CLINIC | Age: 58
End: 2023-10-05
Payer: COMMERCIAL

## 2023-10-05 VITALS
BODY MASS INDEX: 28.19 KG/M2 | SYSTOLIC BLOOD PRESSURE: 121 MMHG | WEIGHT: 186 LBS | HEIGHT: 68 IN | DIASTOLIC BLOOD PRESSURE: 81 MMHG

## 2023-10-05 DIAGNOSIS — G25.81 RLS (RESTLESS LEGS SYNDROME): ICD-10-CM

## 2023-10-05 DIAGNOSIS — G47.33 OSA (OBSTRUCTIVE SLEEP APNEA): Primary | ICD-10-CM

## 2023-10-05 DIAGNOSIS — G47.34 NOCTURNAL HYPOXEMIA: ICD-10-CM

## 2023-10-05 PROCEDURE — 99214 OFFICE O/P EST MOD 30 MIN: CPT | Performed by: PSYCHIATRY & NEUROLOGY

## 2023-10-05 RX ORDER — SEMAGLUTIDE 2.4 MG/.75ML
INJECTION, SOLUTION SUBCUTANEOUS
COMMUNITY
Start: 2023-10-03

## 2023-10-05 NOTE — PROGRESS NOTES
Assessment/Plan:    1. ENRRIQUE (obstructive sleep apnea)    2. RLS (restless legs syndrome)    3. Nocturnal hypoxemia        Subjective:      Patient ID: Josie Calvert is a 59 y.o. female. HPI    Inspire Settings  Incoming Settings  Electrode configuration A + - +   Amplitude (V) 3.5  Range (V) 3.1 to 3.5  Pulse width (us)/ Rate (Hz)  90/33  Start delay (min) 60  Pause Time (min) 15  Therapy Duration (hr) 9    Inspire Usage Data  Used 625 hours, 57 hours per week     [place] Sleepiness Scale  Sitting and reading: Would never doze  Watching TV: Would never doze  Sitting, inactive in a public place (e.g. a theatre or a meeting):  Would never doze  As a passenger in a car for an hour without a break: Would never doze  Lying down to rest in the afternoon when circumstances permit: Would never doze  Sitting and talking to someone: Would never doze  Sitting quietly after a lunch without alcohol: Would never doze  In a car, while stopped for a few minutes in traffic: Would never doze  Total score: 0      Review of Systems      Objective:      /81 (BP Location: Left arm, Patient Position: Sitting, Cuff Size: Large)   Ht 5' 8" (1.727 m)   Wt 84.4 kg (186 lb)   BMI 28.28 kg/m²          Physical Exam

## 2023-10-05 NOTE — PROGRESS NOTES
Progress Note - Sleep Medicine  Eliz Hernández 59 y.o. female MRN: [id number]       Impression & Plan:   Patient is a 61yo F with PMH including HTN, Grave's disease, anxiety, OA who presents for follow up of ENRRIQUE s/p HGNS, RLS. Patient is doing well from standpoint of ENRRIQUE s/p HGNS. Will continue with present settings:    [address]  Incoming Settings  Electrode configuration A + - +   Amplitude (V) 3.5  Range (V) 3.1 to 3.5  Pulse width (us)/ Rate (Hz)  90/33  Start delay (min) 60  Pause Time (min) 15  Therapy Duration (hr) 9    She is to continue with supplemental Oxygen 2L everynight for nocturnal hypoxemia. Workup regarding this has been unremarkable thus far, and she is folowing with Pulmonology. Discussed cessation of vaping, however she is not interested at present time. Regarding RLS, she is doing fairly well with Gabapentin, has uptitrated to 300mg qhs. She still has some mild residual symptoms however these do not cause problems including insomnia or nighttime awakenings. Will continue with same dose of Gabapentin, to be taken at 5:00-6:00PM every evening. Will follow up in approximately 6 months or sooner on PRN basis. 1. ENRRIQUE (obstructive sleep apnea)    2. RLS (restless legs syndrome)    3. Nocturnal hypoxemia   ______________________________________________________________________    HPI:    Eliz Hernández is a 59 y.o. female with PMH including HTN, Grave's disease, anxiety, OA. She presents today for follow up of RLS and obstructive sleep apnea - she is s/p hypoglossal nerve stimulator. Patient was initially diagnosed with ENRRIQUE in 2022 with CHAD 37.4 without central apnea. Patient could not tolerate CPAP in the past. States she would take the device off in the middle of night, could not keep it on. She underwent HGNS implantation in 12/2022 by Dr. Lorenzo Santos. Inspire HGNS was activated on 1/17/2023. Last seen on 7/20/2023.  Patient denies any significant change in PMH, PSH, medications, or allergies since last office visit. Reports breathing has been stable, denies recent wheezing, shortness of breath, or coughing. Patient was started on Gabapentin 300mg qhs for restless leg syndrome at last office visit. Patient reports she is falling asleep much faster with Gabapentin. She is also taking amitryptline for anxiety which helps with sleep initiation insomnia in the past. In the past, it has taken at times 2-3 hours to fall asleep. She reports the Gabapentin is not helping with her RLS symptoms. She complains cramping in her legs, "my toes will lock up", gets a burning sensation in her legs as well as paresthesias in her legs at times. Initially, the gabapentin caused her to feel "loopy" in the morning, so she has been taking it earlier in the evening to help mitigate these symptoms. She takes the Gabapentin between 5-6PM and she feels good the following morning without any excessive drowsiness. Patient was started on supplemental Oxygen 2L/min at nighttime for baseline hypoxemia that was seen on her Inspire fine tune sleep study. Sleep Schedule: Goes to bed between 8:00-9:00PM, takes about 60 minutes to fall asleep (which is improved from 2-3 hours prior to starting gabapentin), she wakes up with alarm 3:00AM, but states she could sleep until 7:00-800PM if she did not set an alarm. She rarely takes a nap, but occasionally gets an opportunity to take a nap at work for 2 hours. Admits to excessive daytime sleepiness that is intermittent depending on how much sleep she is getting the night prior.      Caffeine: 2 tablespoons of [employer] instant coffee in the morning   Alcohol: denies   Denies illicit drug use   Patient is currently vaping (does not plan on quitting), she is a former cigarette smoker, has 17 pack year history and quit approximately one year ago     Driving while drowsy: denies     [place]: 0/24  Sitting and reading: Would never doze  Watching TV: Would never doze  Sitting, inactive in a public place (e.g. a theatre or a meeting): Would never doze  As a passenger in a car for an hour without a break: Would never doze  Lying down to rest in the afternoon when circumstances permit: Would never doze  Sitting and talking to someone: Would never doze  Sitting quietly after a lunch without alcohol: Would never doze  In a car, while stopped for a few minutes in traffic: Would never doze  Total score: 0      Social history updates:  Social History     Tobacco Use   Smoking Status Former   • Packs/day: 0.50   • Years: 35.00   • Total pack years: 17.50   • Types: Cigarettes   • Start date: 0   • Quit date: 10/2022   • Years since quittin.0   Smokeless Tobacco Never   Tobacco Comments    Quit cigg Oct 2022- vapes nicotene daily     Social History     Socioeconomic History   • Marital status: /Civil Union     Spouse name: Not on file   • Number of children: Not on file   • Years of education: Not on file   • Highest education level: Not on file   Occupational History   • Not on file   Tobacco Use   • Smoking status: Former     Packs/day: 0.50     Years: 35.00     Total pack years: 17.50     Types: Cigarettes     Start date: 0     Quit date: 10/2022     Years since quittin.0   • Smokeless tobacco: Never   • Tobacco comments:     Quit cigg Oct 2022- vapes nicotene daily   Vaping Use   • Vaping Use: Every day   • Substances: Nicotine   Substance and Sexual Activity   • Alcohol use: Yes     Comment: occas.     • Drug use: Never   • Sexual activity: Yes     Partners: Male     Birth control/protection: None   Other Topics Concern   • Not on file   Social History Narrative    Does not consume caffeine     Social Determinants of Health     Financial Resource Strain: Not on file   Food Insecurity: Not on file   Transportation Needs: Not on file   Physical Activity: Not on file   Stress: Not on file   Social Connections: Not on file   Intimate Partner Violence: Not on file   Housing Stability: Not on file       PhysicalExamination:  Vitals:   /81 (BP Location: Left arm, Patient Position: Sitting, Cuff Size: Large)   Ht 5' 8" (1.727 m)   Wt 84.4 kg (186 lb)   BMI 28.28 kg/m²     Physical Exam:  General: Sitting in chair, awake alert and oriented to person, place, and time. No acute distress  HEENT: PERRL, nares patent, no craniofacial abnormalities. Mucous membranes, moist, no oral lesions, and normal dentition. Mallampati class IV, tonsils 1+, no neuropraxia   NECK:  Trachea midline, no accessory muscle use, and no stridor   CARDIAC: Regular rate and rhythm, no murmur   PULM: CTA bilaterally no wheezing, rhonchi or rales. No conversational dyspnea  EXT: No cyanosis, no clubbing, and no peripheral edema    NEURO: No focal neurologic deficits, moving all extremities appropriately    Diagnostic Data:  Labs:   I personally reviewed the most recent laboratory data pertinent to today's visit    Lab Results   Component Value Date    WBC 8.17 12/02/2022    HGB 14.4 12/02/2022    HCT 44.0 12/02/2022    MCV 89 12/02/2022     12/02/2022     Lab Results   Component Value Date    CALCIUM 9.1 12/02/2022    K 4.3 12/02/2022    CO2 28 12/02/2022     12/02/2022    BUN 25 12/02/2022    CREATININE 1.32 (H) 12/02/2022     No results found for: "IGE"  No results found for: "ALT", "AST", "GGT", "ALKPHOS", "BILITOT"  Lab Results   Component Value Date    IRON 99 08/18/2023    TIBC 285 08/18/2023    FERRITIN 102 08/18/2023     No results found for: "[place] Scales"  No results found for: "FOLATE"      Arterial Blood Gas result:  N/A    PFTs 6/21/2023:  Results:  FEV1/FVC Ratio: 81 %  Forced Vital Capacity: 3.95 L    107 % predicted  FEV1: 3.19 L     111 % predicted     After administration of bronchodilator   FEV1/FVC Ratio: 84%  FVC: 3.87 L, 105% predicted, -1% change  FEV1: 3.24 L, 112% predicted, 1% change     Lung volumes by body plethysmography:   Total Lung Capacity 95 % predicted   Residual volume 65 % predicted     DLCO corrected for patients hemoglobin level: 87 %     6 Minute Walk:  The patient's starting pulse ox was 96% with a heart rate of 79 and Ursula dyspnea score of 0/10. They walked a total of 366 m in 6 minutes without stopping. At completion the pulse ox was 94% with a heart rate of 89 and Ursula dyspnea of score of 0/10.     Interpretation:     • Normal Spirometry     • No significant response to the administration to bronchodilator per ATS Standards     • Normal Lung volumes     • Normal diffusion capacity     • Flow volume loop is normal     • 6MW results per above. No supplemental oxygen required. Sleep studies:  Diagnostic with Inspire 4/13/2023:  SUMMARY:                                                              TOTAL INDEX   Apneas and Hypopneas 4 0.9   Central Apneas 0 0.0   Arousals 94 21.8   PLMs 120 27.9         IMPRESSION:     1. This was an effective Inspire fine tune titration study. The voltage of 3.4  V at electrode configuration + - +  was effective resulting in an AHI of 0.0. This setting was observed in supine sleep. The setting of 3.3 V was nearly equivalent. 2. Intermittent baseline hypoxemia noted. Oxygenation was best at 3.4 V.  3. Sleep efficiency was low. Increased Stage N1 sleep (light sleep) was present. . Stage N3 sleep (deep sleep) was increased. REM sleep percentage was normal.  4. Increased periodic limb movements during sleep. These contributed to significant sleep disruption and cortical arousals. 5. EKG showed normal sinus rhythm.     RECOMMENDATION:  Based on this  test, Inspire is recommended at an amplitude of 3.4 V. Clinical correlation needed due to hypoxemia.        [address] [place] [address]  Sleep Medicine Fellow

## 2023-10-05 NOTE — PATIENT INSTRUCTIONS
Attempted to reach Johanna ALFONSO for her to call nurse back.     It is very important to avoid driving while drowsy, this can be very dangerous or even cause serious injury or death. If sleepy, it is not safe to get behind the wheel. If you are driving and feels sleepy, it is very important to pull over right away. Even losing control of the car for a split second can be deadly. If you feel you cannot control when sleepiness occurs and cannot prevented, it is important to not drive at all until this improves. Please let me know if you experience this as it is very important. Nursing Support:  When: Monday through Friday 7A-5PM except holidays  Where: Our direct line is [phone]. If you are having a true emergency please call 911. In the event that the line is busy or it is after hours please leave a voice message and we will return your call. Please speak clearly, leaving your full name, birth date, best number to reach you and the reason for your call. Medication refills: We will need the name of the medication, the dosage, the ordering provider, whether you get a 30 or 90 day refill, and the pharmacy name and address. Medications will be ordered by the provider only. Nurses cannot call in prescriptions. Please allow 7 days for medication refills. Physician requested updates: If your provider requested that you call with an update after starting medication, please be ready to provide us the medication and dosage, what time you take your medication, the time you attempt to fall asleep, time you fall asleep, when you wake up, and what time you get out of bed. Sleep Study Results: We will contact you with sleep study results and/or next steps after the physician has reviewed your testing.

## 2024-02-29 ENCOUNTER — OFFICE VISIT (OUTPATIENT)
Dept: SLEEP CENTER | Facility: CLINIC | Age: 59
End: 2024-02-29
Payer: COMMERCIAL

## 2024-02-29 VITALS
BODY MASS INDEX: 26.52 KG/M2 | SYSTOLIC BLOOD PRESSURE: 120 MMHG | WEIGHT: 175 LBS | DIASTOLIC BLOOD PRESSURE: 80 MMHG | HEIGHT: 68 IN

## 2024-02-29 DIAGNOSIS — G47.34 NOCTURNAL HYPOXEMIA: ICD-10-CM

## 2024-02-29 DIAGNOSIS — G25.81 RLS (RESTLESS LEGS SYNDROME): ICD-10-CM

## 2024-02-29 DIAGNOSIS — Z96.82 S/P INSERTION OF HYPOGLOSSAL NERVE STIMULATOR: ICD-10-CM

## 2024-02-29 DIAGNOSIS — E83.10 DISORDER OF IRON METABOLISM: ICD-10-CM

## 2024-02-29 DIAGNOSIS — G47.33 OSA (OBSTRUCTIVE SLEEP APNEA): Primary | ICD-10-CM

## 2024-02-29 PROCEDURE — 99214 OFFICE O/P EST MOD 30 MIN: CPT | Performed by: PSYCHIATRY & NEUROLOGY

## 2024-02-29 PROCEDURE — 95976 ALYS SMPL CN NPGT PRGRMG: CPT | Performed by: PSYCHIATRY & NEUROLOGY

## 2024-02-29 NOTE — Clinical Note
Flight I, please see my note, I discussed him with You.  She has lost a lot of weight and now feels more sleepy, overstimulation?  I am lowering settings, planning for repeat sleep test.  If she feels better with lower settings, could be a case report

## 2024-02-29 NOTE — PROGRESS NOTES
Assessment/Plan:    1. ENRRIQUE (obstructive sleep apnea)  -     Pulse oximetry overnight  -     Diagnostic Sleep Study with Inspire; Future    2. S/P insertion of hypoglossal nerve stimulator  -     Diagnostic Sleep Study with Inspire; Future    3. RLS (restless legs syndrome)  -     Iron, TIBC and Ferritin Panel; Future    4. Disorder of iron metabolism  -     Iron, TIBC and Ferritin Panel; Future    5. Nocturnal hypoxemia  -     Pulse oximetry overnight    We discussed that her clinical picture is somewhat complicated.  She has been doing very well with the inspire device but now describes not feeling rested in the morning.  We discussed in detail about her daytime function and she notes that she still feels safe when she drives, does not have any concern regarding sleepiness/close calls or any dangerous driving.  We discussed that with the change she should stop driving immediately and she understands this.    She has lost a very significant amount of weight since she has been treated with the inspire device.  Overall, her weight is down about 50 pounds from her diagnostic sleep study before inspire and about 30 pounds since her first assessment with me.  We discussed that there is a possibility that with weight loss she now has overstimulation from the inspire device causing paradoxically increased sleepiness.  The way to prove this is to lower inspire settings and see if her symptoms improve.  She had nearly equivalent tongue protrusion at both 2.5 and 3.0 V, and discussing with You Bronson from inspire, it was decided to reduce the patient's voltage to 3.0 V.    I advised Isha that in reducing the voltage, she should be watchful for increased sleepiness or worsened symptoms.  If that is to occur she should let me know and stop driving.  She understood and promised to follow my instructions.  Ultimately, she needs a test in the sleep lab to ascertain what is going on.  I ordered this test as a split study.   The first few hours the test will be performed without use of the inspire device to assess if she still has significant obstructive sleep apnea with her weight loss.  The remainder of the test will be used to adjust the inspire device.  She is prescribed supplemental oxygen at home, this will likely be used during her test if hypoxemia persists.    I have ordered an overnight oximetry test.  Her primary care doctor had advised increasing oxygen from 2 to 3 L, I am not sure if that is needed, will defer to him or pulmonary.  I have ordered an overnight oximetry test though to assess oxygenation with her current therapy settings.    At a previous visit I had stopped oral iron therapy, I recommend rechecking her iron level.  She is not being treated at present for restless leg syndrome, once we determine the inspire voltage, we will need to then better address this.  We discussed that once she knows how she responds to the lower inspire voltage, she could try gabapentin 100 mg to see if that provides any clinical benefit.      Inspire Settings  Outgoing Settings  Electrode configuration A + - +   Amplitude (V) 3.0  Range (V) 2.5 to 3.5  Pulse width (us)/ Rate (Hz)  90/33  Start delay (min) 30  Pause Time (min) 15  Therapy Duration (hr) 9     Subjective:      Patient ID: Isha Gallego is a 58 y.o. female.    HPI    This is a 58-year-old female who returns in a follow-up visit, she has a history of obstructive sleep apnea treated with the inspire device.  I last saw her October 2023, at that visit we discussed that she was consistently using the inspire device and doing well.  She is all treated with supplemental oxygen at 2 L/min.  With regard to restless leg syndrome, she has found gabapentin to be effective.  She has been on thyroid therapy, I recommended stopping this at the last visit.    She was diagnosed with obstructive sleep apnea 2022, respiratory event index 37.4 without signs of central apnea.  She had  surgery for implantation of the inspire device in December 2022 by Dr. Kendrick.  Operative notes notes good stimulation at 0.7 V and no retraction at 0.4 V.  Her last visit with me, she had an outgoing setting of 2.8 V.  She had a fine-tune inspire sleep study and had an excellent result.  She has been treated at 3.5 V and was doing well at her last visit with me in October 2023    Chief complaint, she is awakening feeling unrested, like she had not slept well.    Since last visit, not taking iron.   Sometimes has RLS in the evening.  Moves her legs in bed.  Can;t take gabapentin, feels too drowsy    Still using Inspire every night,  Has lost 70 pounds.    Wakes with headaches if she does not oxygen- last night power was out and she could not use it     Can sleep 10 hours with Inspire and wake feeling like she has not slept.   Not sleepy with driving. No close calls or near misses.  No hua shifts or MVA from sleepiness.  Feels in control when driving    1 cup of coffee in the AM, iced coffee at work., coke zero daily    Works form 430 am- 2 pm  Going to bed 8 pm, asleep in 30 min, up 6-7 am ; sleeps through the night   Sleeps 7 hours a night    Was at 2 L/min, increased to 3 L/min two months ago as she felt more sleepy     Inspire Settings  Incoming Settings  Electrode configuration A + - +   Amplitude (V) 3.5  Range (V) 3.1 to 3.5  Pulse width (us)/ Rate (Hz)  90/33  Start delay (min) 30  Pause Time (min) 15  Therapy Duration (hr) 9    Inspire Usage Data  Used 29 of 30 days, average session 7 hr 45 min, 0.2 pauses per night        [place] Sleepiness Scale  Sitting and reading: Would never doze  Watching TV: Moderate chance of dozing  Sitting, inactive in a public place (e.g. a theatre or a meeting): Would never doze  As a passenger in a car for an hour without a break: Would never doze  Lying down to rest in the afternoon when circumstances permit: Moderate chance of dozing  Sitting and talking to someone: Would  "never doze  Sitting quietly after a lunch without alcohol: Would never doze  In a car, while stopped for a few minutes in traffic: Would never doze  Total score: 4      Review of Systems    As above    Objective:      /80 (BP Location: Left arm, Patient Position: Sitting, Cuff Size: Large)   Ht 5' 8\" (1.727 m)   Wt 79.4 kg (175 lb)   BMI 26.61 kg/m²          Physical Exam    Tongue is midline, has full range of motion  2.0 v- no protrusion  2.5, 3.0, 3.5 v- comfortable protrusion  2.3 v minimal protrusion     I assessed waveforms, there is no sign of downward deflection  "

## 2024-02-29 NOTE — PATIENT INSTRUCTIONS
It is very important to avoid driving while drowsy, this can be very dangerous or even cause serious injury or death.  If sleepy, it is not safe to get behind the wheel.  If you are driving and feels sleepy, it is very important to pull over right away.  Even losing control of the car for a split second can be deadly.  If you feel you cannot control when sleepiness occurs and cannot prevented, it is important to not drive at all until this improves.  Please let me know if you experience this as it is very important.     Nursing Support:  When: Monday through Friday 7A-5PM except holidays  Where: Our direct line is [phone].    If you are having a true emergency please call 911.  In the event that the line is busy or it is after hours please leave a voice message and we will return your call.  Please speak clearly, leaving your full name, birth date, best number to reach you and the reason for your call.   Medication refills: We will need the name of the medication, the dosage, the ordering provider, whether you get a 30 or 90 day refill, and the pharmacy name and address.  Medications will be ordered by the provider only.  Nurses cannot call in prescriptions.  Please allow 7 days for medication refills.  Physician requested updates: If your provider requested that you call with an update after starting medication, please be ready to provide us the medication and dosage, what time you take your medication, the time you attempt to fall asleep, time you fall asleep, when you wake up, and what time you get out of bed.  Sleep Study Results: We will contact you with sleep study results and/or next steps after the physician has reviewed your testing.

## 2024-03-01 ENCOUNTER — TELEPHONE (OUTPATIENT)
Dept: SLEEP CENTER | Facility: CLINIC | Age: 59
End: 2024-03-01

## 2024-03-01 LAB
DME PARACHUTE DELIVERY DATE REQUESTED: NORMAL
DME PARACHUTE ITEM DESCRIPTION: NORMAL
DME PARACHUTE ORDER STATUS: NORMAL
DME PARACHUTE SUPPLIER NAME: NORMAL
DME PARACHUTE SUPPLIER PHONE: NORMAL

## 2024-03-01 NOTE — TELEPHONE ENCOUNTER
Rx for MARIA FERNANDA with clinical note sent to [facility] via [facility].

## 2024-03-05 LAB
FERRITIN SERPL-MCNC: 106 NG/ML (ref 11–306.8)
IRON SATN MFR SERPL: 28 % (ref 20–50)
IRON SERPL-MCNC: 80 UG/DL (ref 50–212)
TIBC SERPL-MCNC: 283 UG/DL (ref 260–430)
TRANSFERRIN SERPL-MCNC: 202 MG/DL (ref 203–362)

## 2024-03-08 ENCOUNTER — HOSPITAL ENCOUNTER (OUTPATIENT)
Dept: SLEEP CENTER | Facility: CLINIC | Age: 59
Discharge: HOME | End: 2024-03-08
Payer: COMMERCIAL

## 2024-03-08 DIAGNOSIS — Z96.82 S/P INSERTION OF HYPOGLOSSAL NERVE STIMULATOR: ICD-10-CM

## 2024-03-08 DIAGNOSIS — G47.33 OSA (OBSTRUCTIVE SLEEP APNEA): ICD-10-CM

## 2024-03-08 LAB
DME PARACHUTE DELIVERY DATE ACTUAL: NORMAL
DME PARACHUTE DELIVERY DATE EXPECTED: NORMAL
DME PARACHUTE DELIVERY DATE REQUESTED: NORMAL
DME PARACHUTE ITEM DESCRIPTION: NORMAL
DME PARACHUTE ORDER STATUS: NORMAL
DME PARACHUTE SUPPLIER NAME: NORMAL
DME PARACHUTE SUPPLIER PHONE: NORMAL

## 2024-03-08 PROCEDURE — 95810 POLYSOM 6/> YRS 4/> PARAM: CPT

## 2024-03-08 PROCEDURE — 95810 POLYSOM 6/> YRS 4/> PARAM: CPT | Performed by: INTERNAL MEDICINE

## 2024-03-08 PROCEDURE — 95976 ALYS SMPL CN NPGT PRGRMG: CPT

## 2024-03-09 NOTE — PROGRESS NOTES
Sleep Study Documentation    Pre-Sleep Study       Sleep testing procedure explained to patient:YES    Patient napped prior to study:NO    Caffeine:Dayshift worker after 12PM.  Caffeine use:YES- soda  12 to 26 ounces    Alcohol:Dayshift workers after 5PM: Alcohol use:NO    Typical day for patient:YES       Study Documentation    Sleep Study Indications: EDS    Sleep Study: Split Optimal : 2.8V  Snore:Eliminated  REM Obtained:yes  Supplemental O2: no    Minimum SaO2 85  Baseline SaO2 95    Snoring was eliminated  at 2.8V  Minimum SaO2 at final  87    Mode of Therapy: INSPIRE    EKG abnormalities: yes:  EPOCH example and comments: Possible RBBB    EEG abnormalities: no    Were abnormal behaviors in sleep observed:NO    Is Total Sleep Study Recording Time < 2 hours: N/A    Is Total Sleep Study Recording Time > 2 hours but study is incomplete: N/A    Is Total Sleep Study Recording Time 6 hours or more but sleep was not obtained: NO    Patient classification: employed       Post-Sleep Study    Medication used at bedtime or during sleep study:YES other prescription medications    Patient reports time it took to fall asleep:20 to 30 minutes    Patient reports waking up during study:1 to 2 times.  Patient reports returning to sleep without difficulty.    Patient reports sleeping 4 to 6 hours without dreaming.    Does the Patient feel this is a typical night of sleep:typical    Patient rated sleepiness: Not sleepy or tired    PAP treatment:no.

## 2024-03-11 DIAGNOSIS — E61.1 IRON DEFICIENCY: Primary | ICD-10-CM

## 2024-03-12 ENCOUNTER — TELEPHONE (OUTPATIENT)
Dept: SLEEP CENTER | Facility: CLINIC | Age: 59
End: 2024-03-12

## 2024-03-12 NOTE — TELEPHONE ENCOUNTER
I spoke with the patient to follow-up.  She is feeling better with a reduction in the inspire settings.  We discussed her split study which did not show obstructive sleep apnea on the diagnostic portion of that test.  Supplemental oxygen was not used on the part of the test.  Of note, she did not stop inspire prior to sleep testing.    I advised that she should keep reducing her inspire settings by 1 level a week until she feels back to her normal self.  While she feels better, she does not feel as she did months ago.    In August when she is on vacation, we can explore discontinuing inspire use to see how she feels.  I would not recommend stopping use of inspire unless she were to take time off from work    We discussed the FMLA form that I was provided.  I began to fill it out but realized I do not have all of the paperwork, there are a few pages missing.  In general, we discussed that she would need occasional time off 1 day a week as needed, in the instance that she awakens and feels not refreshed and unable to safely drive.  This is more precaution for her to have on file but not something that is regularly needed.    I will also plan to discuss the case further with representatives from [employer], may explore reducing the setting only down to 2.6 V at this time based on results of the sleep study.    Last we discussed that she may have a bundle branch block on her single-channel EKG, I recommend she discuss with her primary care doctor to see if she needs a full EKG to further assess.

## 2024-03-12 NOTE — TELEPHONE ENCOUNTER
Happy to look at it on a case report. I can have one of the residents and/or students write it up. Probably worth more to a student than to one of your [place].     ~j

## 2024-03-12 NOTE — TELEPHONE ENCOUNTER
I called the patient back, she will try to obtain new FMLA forms.    I discussed the case with You Bronson from [facility], based on our discussion, we will have the patient lower her inspire settings to level 2 (2.6 V).  This setting was studied for 1 hour and 40 minutes on her sleep study resulting in AHI of 0.6.  I advised the patient to make this change prior to the beginning of an off day to make sure she tolerates it well and does not have worsened sleepiness.  I do not expect this to occur.

## 2024-04-11 ENCOUNTER — OFFICE VISIT (OUTPATIENT)
Dept: SLEEP CENTER | Facility: CLINIC | Age: 59
End: 2024-04-11
Payer: COMMERCIAL

## 2024-04-11 VITALS
DIASTOLIC BLOOD PRESSURE: 70 MMHG | SYSTOLIC BLOOD PRESSURE: 100 MMHG | WEIGHT: 172 LBS | BODY MASS INDEX: 26.07 KG/M2 | HEIGHT: 68 IN

## 2024-04-11 DIAGNOSIS — Z96.82 S/P INSERTION OF HYPOGLOSSAL NERVE STIMULATOR: ICD-10-CM

## 2024-04-11 DIAGNOSIS — G47.33 OSA (OBSTRUCTIVE SLEEP APNEA): Primary | ICD-10-CM

## 2024-04-11 PROCEDURE — 95970 ALYS NPGT W/O PRGRMG: CPT | Performed by: PSYCHIATRY & NEUROLOGY

## 2024-04-11 PROCEDURE — 99214 OFFICE O/P EST MOD 30 MIN: CPT | Performed by: PSYCHIATRY & NEUROLOGY

## 2024-04-11 RX ORDER — DIPHENOXYLATE HYDROCHLORIDE AND ATROPINE SULFATE 2.5; .025 MG/1; MG/1
TABLET ORAL
COMMUNITY

## 2024-04-11 RX ORDER — LEVOFLOXACIN 500 MG/1
TABLET, FILM COATED ORAL
COMMUNITY

## 2024-04-11 RX ORDER — CHLORHEXIDINE GLUCONATE ORAL RINSE 1.2 MG/ML
SOLUTION DENTAL
COMMUNITY

## 2024-04-11 RX ORDER — METRONIDAZOLE 500 MG/1
TABLET ORAL
COMMUNITY

## 2024-04-11 RX ORDER — BUDESONIDE AND FORMOTEROL FUMARATE DIHYDRATE 160; 4.5 UG/1; UG/1
AEROSOL RESPIRATORY (INHALATION)
COMMUNITY

## 2024-04-11 RX ORDER — DOXYCYCLINE 100 MG/1
TABLET ORAL
COMMUNITY
Start: 2024-01-29

## 2024-04-11 RX ORDER — ACETAMINOPHEN AND CODEINE PHOSPHATE 300; 30 MG/1; MG/1
TABLET ORAL
COMMUNITY
End: 2024-04-11

## 2024-04-11 RX ORDER — CEFDINIR 300 MG/1
CAPSULE ORAL
COMMUNITY

## 2024-04-29 ENCOUNTER — TELEPHONE (OUTPATIENT)
Age: 59
End: 2024-04-29

## 2024-04-29 NOTE — TELEPHONE ENCOUNTER
Patients PCP called- he would like to speak with an endocrinologist about this patient. He is unhappy with how things were handled through [facility] and would like a second opinion. His call back number is [phone].

## 2024-04-30 NOTE — TELEPHONE ENCOUNTER
Spoke with with Dr. Dempsey and he is stating patient has Graves disease and has been on long term methimazole which is now affecting her muscle and gait. He is asking if patient can be considered for either I131 therapy or surgery. He will also be sending medical records and will have staff call office to schedule an appointment for patient.

## 2024-07-16 ENCOUNTER — CONSULT (OUTPATIENT)
Dept: ENDOCRINOLOGY | Facility: CLINIC | Age: 59
End: 2024-07-16
Payer: COMMERCIAL

## 2024-07-16 VITALS
SYSTOLIC BLOOD PRESSURE: 110 MMHG | DIASTOLIC BLOOD PRESSURE: 78 MMHG | WEIGHT: 176.2 LBS | HEIGHT: 68 IN | BODY MASS INDEX: 26.7 KG/M2

## 2024-07-16 DIAGNOSIS — E04.2 MULTIPLE THYROID NODULES: ICD-10-CM

## 2024-07-16 DIAGNOSIS — E05.00 GRAVES DISEASE: Primary | ICD-10-CM

## 2024-07-16 PROCEDURE — 99204 OFFICE O/P NEW MOD 45 MIN: CPT | Performed by: INTERNAL MEDICINE

## 2024-07-16 RX ORDER — SEMAGLUTIDE 1.7 MG/.75ML
1.7 INJECTION, SOLUTION SUBCUTANEOUS
COMMUNITY
Start: 2024-07-15

## 2024-07-16 NOTE — PROGRESS NOTES
03/16/19 1100   Psycho Education   Type of Intervention structured groups   Response participates, initiates socially appropriate   Hours 1   Treatment Detail Daystart/Boundaries   This group went over 6ae s unit Boundaries/rules and expectations. By the end of the group patients were able to express understanding of unit boundaries/rules/expectations and the consequences of violating them. Signature earned for attending boundaries     "Chief Complaint   Patient presents with   • Hyperthyroidism      Referring Provider  No referring provider defined for this encounter.     History of Present Illness:   Isha Gallego is a 58 y.o. female with hyperthyrodism seen for evaluation.    The chart is reviewed. She last saw Dr. Smith in Jan 2024. This note and the chart is reviewed.   Dx with Graves' Disease in May 2021. Needed I-123 uptake and scan in the past 5/12/2021. She did have sweating an feeling hot at times.   Was taking methimazole, reduced to 5mg three times per week. This was weaned down though cannot recall exact date.   This has been over 3-4mos since she has taken any methimazole. It was suggested to her that she should get an iodine 131 ablation, however her TSH is currently normal.     Symptoms today include noted sweating arie in feet axillae, also persistent hot flashes, hair loss., and feeling \"jittery\"intermittently. These do not persist and have not become more frequent.    Denies changes in neck, changes in voice. She has dysphagia to dry food or large food pieces. Radiation exposure to head/neck/chest  Medications impacting the thyroid     Hx of thyroid nodules. Small nodules seen in 2014    Seeing weight management at [facility] and on [facility].     Patient Active Problem List   Diagnosis   • HTN (hypertension)   • GERD (gastroesophageal reflux disease)   • Multiple thyroid nodules   • ENRRIQUE (obstructive sleep apnea)   • Lumbar spondylosis   • Graves disease   • Spondylolisthesis, acquired   • Osteoarthritis of spine with radiculopathy, lumbar region   • Anxiety   • Nocturnal hypoxemia   • History of cigarette smoking   • Periodic limb movement sleep disorder   • Current every day nicotine vaping      Past Medical History:   Diagnosis Date   • Allergic rhinitis    • Anxiety    • Arthritis    • Asthma    • Colon polyp    • COPD (chronic obstructive pulmonary disease) (HCC)    • COVID     Dec 2020   • CPAP (continuous positive airway pressure) " dependence    • Disease of thyroid gland    • GERD (gastroesophageal reflux disease)    • History of lumbar fusion    • Hypertension    • Osteoporosis    • Skin cancer     on nose in past   • Sleep apnea    • Vapes nicotine containing substance    • Wears glasses       Past Surgical History:   Procedure Laterality Date   • APPENDECTOMY     • BACK SURGERY      lumbar fusion   • COLONOSCOPY     • TX OPEN IMPLTJ HPGLSL NRV NSTIM RA PG&RESPIR SENSOR N/A 2022    Procedure: INSERTION UPPER AIRWAY STIMULATOR (INSPIRE IMPLANT);  Surgeon: Balwinder Kendrick MD;  Location: [facility];  Service: ENT   • TONSILLECTOMY     • TUBAL LIGATION     • WISDOM TOOTH EXTRACTION        Family History   Problem Relation Age of Onset   • Hypertension Mother    • Polycystic kidney disease Mother    • Sleep apnea Mother    • Hypertension Father      Social History     Tobacco Use   • Smoking status: Former     Current packs/day: 0.00     Average packs/day: 0.5 packs/day for 45.7 years (22.9 ttl pk-yrs)     Types: Cigarettes     Start date:      Quit date: 10/2022     Years since quittin.7   • Smokeless tobacco: Never   • Tobacco comments:     Quit cigg Oct 2022- vapes nicotene daily   Substance Use Topics   • Alcohol use: Yes     Comment: occas.      Allergies   Allergen Reactions   • Chantix [Varenicline] GI Intolerance         Current Outpatient Medications:   •  amitriptyline (ELAVIL) 25 mg tablet, Take 25 mg by mouth daily at bedtime, Disp: , Rfl:   •  amLODIPine (NORVASC) 5 mg tablet, Take 5 mg by mouth daily, Disp: , Rfl:   •  ascorbic acid (VITAMIN C) 1000 MG tablet, , Disp: , Rfl:   •  aspirin (ECOTRIN LOW STRENGTH) 81 mg EC tablet, 81 mg daily Pt took last dose , Disp: , Rfl:   •  cholecalciferol (VITAMIN D3) 1,000 units tablet, Take 1,000 Units by mouth daily, Disp: , Rfl:   •  furosemide (LASIX) 40 mg tablet, Take 40 mg by mouth daily with lunch, Disp: , Rfl:   •  metoprolol tartrate (LOPRESSOR)  "50 mg tablet, Take 50 mg by mouth every 12 (twelve) hours, Disp: , Rfl:   •  Multiple Vitamins-Minerals (CENTRUM SILVER 50+WOMEN) TABS, Take by mouth daily, Disp: , Rfl:   •  Omega-3 Fatty Acids (fish oil) 1,000 mg, Take 1,000 mg by mouth daily, Disp: , Rfl:   •  omeprazole (PriLOSEC) 40 MG capsule, Take 40 mg by mouth every evening Take before a meal, Disp: , Rfl:   •  PARoxetine (PAXIL) 20 mg tablet, Take 20 mg by mouth daily, Disp: , Rfl:   •  terazosin (HYTRIN) 5 mg capsule, Take 2 mg by mouth daily at bedtime, Disp: , Rfl:   •  Wegovy 1.7 MG/0.75ML, Inject 1.7 mg under the skin, Disp: , Rfl:   •  fexofenadine (ALLEGRA) 180 MG tablet, Take 180 mg by mouth daily (Patient not taking: Reported on 10/5/2023), Disp: , Rfl:   Review of Systems   Constitutional:  Negative for unexpected weight change.   HENT:  Positive for trouble swallowing and voice change.    Eyes:  Negative for visual disturbance.   Respiratory:  Negative for shortness of breath.    Cardiovascular:  Positive for palpitations.   Gastrointestinal:  Positive for constipation.   Musculoskeletal:  Negative for gait problem.   Neurological:  Negative for tremors.   Psychiatric/Behavioral:  Positive for sleep disturbance. The patient is not nervous/anxious.         \"I get jittery\"       Physical Exam:  Body mass index is 26.79 kg/m².  /78 (BP Location: Right arm, Patient Position: Sitting, Cuff Size: Adult)   Ht 5' 8\" (1.727 m)   Wt 79.9 kg (176 lb 3.2 oz)   BMI 26.79 kg/m²    Wt Readings from Last 3 Encounters:   07/16/24 79.9 kg (176 lb 3.2 oz)   04/11/24 78 kg (172 lb)   02/29/24 79.4 kg (175 lb)       GEN: NAD  E/n/m nl facies, hearing intact bilat, tongue midline, lips nl  Eyes: no stare or proptosis, nl lids, EOMI  Neck: trachea midline, thyroid NT to palpation, nl in size, no nodules or neck masses noted, no cervical LAD  CV; heart reg rate s1s2 nl, no m/r/g appreciated  Resp: CTAB, good effort  Ab+BS  Neuro: no tremor  MS: no c/c in " digits, moves all 4 ext, nl muscle bulk, gait nl  Skin: warm and dry, no palmar erythema  Psych: nl mood and affect, no gross lapses in memory    DATA:  Labs:       Lab Results   Component Value Date    TSH 0.89 07/10/2024    FREET4 1.23 (H) 01/05/2024         Radiology  2014 thyroid ultrasound  Left midpole posterior hypoechoic nodule 0.4 x 0.2 x 0.4 cm unchanged.   The left lower pole nodule is no longer visualized.     Right upper pole septated cyst 0.6 x 0.5 x 0.5 cm unchanged.   Right midpole lateral septated cyst 0.5 x 0.3 x 0.3 cm unchanged.     Impression:  1. Graves disease    2. Multiple thyroid nodules           Plan:    Isha was seen today for hyperthyroidism.    Diagnoses and all orders for this visit:    Graves disease  -     TSH, 3rd generation; Future  -     T4, free; Future  -     T3; Future    Multiple thyroid nodules        History of Graves Disease: Appears in remission currently with normal TSH. There would be no need for additional treatments at this time. Should the Graves' recur, I reviewed that antithyroid drugs, I-131, and surgery options are all available. Plan repeat TFTs in 3mos and RTC in 6mos  Thyroid nodules: no thyromegaly or enlargement on exam. I do not recommend any imaging. If she notices a visible change in her neck, she will contact the office.        Discussed with the patient and all questioned fully answered. She will call me if any problems arise.        Elle Sexton MD

## 2024-07-26 ENCOUNTER — TELEPHONE (OUTPATIENT)
Dept: PULMONOLOGY | Facility: CLINIC | Age: 59
End: 2024-07-26

## 2024-07-26 NOTE — TELEPHONE ENCOUNTER
LVM to re-schedule 8/22.     8/20 at 1:20 is open if she wanted that time. Please check to see if still available when she calls back

## 2024-08-30 ENCOUNTER — OFFICE VISIT (OUTPATIENT)
Dept: PULMONOLOGY | Facility: CLINIC | Age: 59
End: 2024-08-30
Payer: COMMERCIAL

## 2024-08-30 VITALS
HEART RATE: 85 BPM | TEMPERATURE: 96.9 F | OXYGEN SATURATION: 94 % | BODY MASS INDEX: 26.73 KG/M2 | HEIGHT: 68 IN | WEIGHT: 176.4 LBS | SYSTOLIC BLOOD PRESSURE: 114 MMHG | DIASTOLIC BLOOD PRESSURE: 78 MMHG

## 2024-08-30 DIAGNOSIS — G47.19 HYPERSOMNOLENCE DISORDER, PERSISTENT, MODERATE, WITH ANOTHER SLEEP DISORDER: ICD-10-CM

## 2024-08-30 DIAGNOSIS — G47.9 HYPERSOMNOLENCE DISORDER, PERSISTENT, MODERATE, WITH ANOTHER SLEEP DISORDER: ICD-10-CM

## 2024-08-30 DIAGNOSIS — F17.211 CIGARETTE NICOTINE DEPENDENCE IN REMISSION: ICD-10-CM

## 2024-08-30 DIAGNOSIS — G47.34 NOCTURNAL HYPOXEMIA: ICD-10-CM

## 2024-08-30 DIAGNOSIS — G47.33 OSA (OBSTRUCTIVE SLEEP APNEA): Primary | ICD-10-CM

## 2024-08-30 PROCEDURE — 99214 OFFICE O/P EST MOD 30 MIN: CPT | Performed by: INTERNAL MEDICINE

## 2024-08-30 RX ORDER — SEMAGLUTIDE 1 MG/.5ML
1 INJECTION, SOLUTION SUBCUTANEOUS WEEKLY
COMMUNITY
Start: 2024-06-17

## 2024-08-30 NOTE — ASSESSMENT & PLAN NOTE
Suspect this is medication related as noted above  Dr. Aguilar has offered modafinil but she prefers to avoid additional medications at this time  She will make the changes as noted above and reevaluate her symptoms

## 2024-08-30 NOTE — ASSESSMENT & PLAN NOTE
Quit smoking about 2 years ago, has over a 20-pack-year history  Does meet lung cancer screening criteria, last screening CT scan was in 2022 and she is currently overdue  Lung cancer screening scan ordered

## 2024-08-30 NOTE — PROGRESS NOTES
Progress note - Pulmonary Medicine   Isha Gallego 58 y.o. female MRN: [id number]       Impression & Plan:     ENRRIQUE (obstructive sleep apnea)  Followed by Dr. Aguilar  On inspire  Still reports significant fatigue during the daytime which may be medication related.  Potential contributors include her Allegra which she takes in the morning, metoprolol which she takes twice a day, amitriptyline which she has recently adjusted the timing of, and paroxetine  She does not have a lot of allergy symptoms and she is going to try to discontinue the Allegra  She has had much better control of her blood pressure since losing weight and will reduce the metoprolol to 25 mg twice daily and monitor her blood pressure with her home cuff    Nocturnal hypoxemia  Last oximetry performed was an overnight on 2 L which showed some desaturation but overall was effectively treated with 2 L  We will repeat her oximetry on room air to see if she still demonstrates significant desaturation quiring supplemental oxygen.  She has lost weight since the last evaluation and may no longer require oxygen supplementation    Hypersomnolence disorder, persistent, moderate, with another sleep disorder  Suspect this is medication related as noted above  Dr. Aguilar has offered modafinil but she prefers to avoid additional medications at this time  She will make the changes as noted above and reevaluate her symptoms    Cigarette nicotine dependence in remission  Quit smoking about 2 years ago, has over a 20-pack-year history  Does meet lung cancer screening criteria, last screening CT scan was in 2022 and she is currently overdue  Lung cancer screening scan ordered      Return in about 1 year (around 8/30/2025).      ______________________________________________________________________    HPI:    Isha Gallego presents today for follow-up of obstructive sleep apnea with persistent nocturnal hypoxemia after inspire.  She still reports significant  daytime fatigue and feeling sleepy throughout the day.  She is however on Allegra which she takes in the morning, metoprolol twice daily, amitriptyline, and paroxetine.  All of these are potentially contributors to sleepiness/somnolence.    She does feel her inspire device has worked very well for her.  She finds it actually helps her get to sleep.  She is using the supplemental oxygen at nighttime.  More recently she has had headaches in the morning after changing the tubing.  She would be very interested in eliminating oxygen if possible.    She has continued to lose weight.  She has been on Wegovy.  She has made conscious efforts for ongoing weight loss.  She denies any other new symptoms.    Current Medications:    Current Outpatient Medications:     amitriptyline (ELAVIL) 25 mg tablet, Take 25 mg by mouth daily at bedtime, Disp: , Rfl:     amLODIPine (NORVASC) 5 mg tablet, Take 5 mg by mouth daily, Disp: , Rfl:     ascorbic acid (VITAMIN C) 1000 MG tablet, , Disp: , Rfl:     aspirin (ECOTRIN LOW STRENGTH) 81 mg EC tablet, 81 mg daily Pt took last dose 12/6, Disp: , Rfl:     cholecalciferol (VITAMIN D3) 1,000 units tablet, Take 1,000 Units by mouth daily, Disp: , Rfl:     fexofenadine (ALLEGRA) 180 MG tablet, Take 180 mg by mouth daily, Disp: , Rfl:     furosemide (LASIX) 40 mg tablet, Take 40 mg by mouth daily with lunch, Disp: , Rfl:     metoprolol tartrate (LOPRESSOR) 50 mg tablet, Take 50 mg by mouth every 12 (twelve) hours, Disp: , Rfl:     Multiple Vitamins-Minerals (CENTRUM SILVER 50+WOMEN) TABS, Take by mouth daily, Disp: , Rfl:     Omega-3 Fatty Acids (fish oil) 1,000 mg, Take 1,000 mg by mouth daily, Disp: , Rfl:     omeprazole (PriLOSEC) 40 MG capsule, Take 40 mg by mouth every evening Take before a meal, Disp: , Rfl:     PARoxetine (PAXIL) 20 mg tablet, Take 20 mg by mouth daily, Disp: , Rfl:     terazosin (HYTRIN) 5 mg capsule, Take 2 mg by mouth daily at bedtime, Disp: , Rfl:     Wegovy 1 MG/0.5ML,  "Inject 1 mg under the skin once a week, Disp: , Rfl:     Review of Systems:  Review of Systems  Aside from what is mentioned in the HPI, the review of systems is otherwise negative    Past medical history, surgical history, and family history were reviewed and updated as appropriate    Social history updates:  Social History     Tobacco Use   Smoking Status Former    Current packs/day: 0.00    Average packs/day: 0.5 packs/day for 45.7 years (22.9 ttl pk-yrs)    Types: Cigarettes    Start date:     Quit date: 10/2022    Years since quittin.9   Smokeless Tobacco Never   Tobacco Comments    Quit cigg Oct 2022- vapes nicotene daily       PhysicalExamination:  Vitals:   /78 (BP Location: Left arm, Patient Position: Sitting, Cuff Size: Standard)   Pulse 85   Temp (!) 96.9 °F (36.1 °C) (Tympanic)   Ht 5' 8\" (1.727 m)   Wt 80 kg (176 lb 6.4 oz)   SpO2 94%   BMI 26.82 kg/m²     Gen:  Comfortable on room air.  No conversational dyspnea  HEENT:  Conjugate gaze.  sclerae anicteric.  Oropharynx moist  Neck: Trachea is midline. No JVD. No adenopathy  Chest: Her lungs are clear  Cardiac: Regular. no murmur  Abdomen:  benign  Extremities: No edema  Neuro:  Normal speech and mentation    Diagnostic Data:    Last overnight oximetry was performed in March and this was done on 2 L with inspire therapy.  She did show desaturation to a low of 89% on 2 L but overall was effectively treated with oxygen    Bear Loza MD  "

## 2024-08-30 NOTE — ASSESSMENT & PLAN NOTE
Followed by Dr. Aguilar  On inspire  Still reports significant fatigue during the daytime which may be medication related.  Potential contributors include her Allegra which she takes in the morning, metoprolol which she takes twice a day, amitriptyline which she has recently adjusted the timing of, and paroxetine  She does not have a lot of allergy symptoms and she is going to try to discontinue the Allegra  She has had much better control of her blood pressure since losing weight and will reduce the metoprolol to 25 mg twice daily and monitor her blood pressure with her home cuff

## 2024-08-30 NOTE — ASSESSMENT & PLAN NOTE
Last oximetry performed was an overnight on 2 L which showed some desaturation but overall was effectively treated with 2 L  We will repeat her oximetry on room air to see if she still demonstrates significant desaturation quiring supplemental oxygen.  She has lost weight since the last evaluation and may no longer require oxygen supplementation

## 2024-09-03 LAB
DME PARACHUTE DELIVERY DATE ACTUAL: NORMAL
DME PARACHUTE DELIVERY DATE REQUESTED: NORMAL
DME PARACHUTE ITEM DESCRIPTION: NORMAL
DME PARACHUTE ORDER STATUS: NORMAL
DME PARACHUTE SUPPLIER NAME: NORMAL
DME PARACHUTE SUPPLIER PHONE: NORMAL

## 2024-09-24 ENCOUNTER — OFFICE VISIT (OUTPATIENT)
Dept: SLEEP CENTER | Facility: CLINIC | Age: 59
End: 2024-09-24
Payer: COMMERCIAL

## 2024-09-24 VITALS
WEIGHT: 170 LBS | HEART RATE: 81 BPM | DIASTOLIC BLOOD PRESSURE: 72 MMHG | BODY MASS INDEX: 25.76 KG/M2 | HEIGHT: 68 IN | SYSTOLIC BLOOD PRESSURE: 101 MMHG

## 2024-09-24 DIAGNOSIS — G47.34 NOCTURNAL HYPOXEMIA: ICD-10-CM

## 2024-09-24 DIAGNOSIS — G47.33 OSA (OBSTRUCTIVE SLEEP APNEA): Primary | ICD-10-CM

## 2024-09-24 DIAGNOSIS — Z96.82 S/P INSERTION OF HYPOGLOSSAL NERVE STIMULATOR: ICD-10-CM

## 2024-09-24 PROCEDURE — 99214 OFFICE O/P EST MOD 30 MIN: CPT | Performed by: PSYCHIATRY & NEUROLOGY

## 2024-09-24 PROCEDURE — 95976 ALYS SMPL CN NPGT PRGRMG: CPT | Performed by: PSYCHIATRY & NEUROLOGY

## 2024-09-24 RX ORDER — SEMAGLUTIDE 2.4 MG/.75ML
INJECTION, SOLUTION SUBCUTANEOUS
COMMUNITY
Start: 2024-09-04

## 2024-09-24 NOTE — PROGRESS NOTES
Assessment/Plan:    1. ENRRIQUE (obstructive sleep apnea)  2. S/P insertion of hypoglossal nerve stimulator  3. Nocturnal hypoxemia      Isha is doing well and is consistently using the inspire device.  It is beneficial and I would not change voltage.  She had good tongue protrusion today and no sign of downward deflection.  We will likely plan for a repeat sleep study next year.  Changes to the inspire settings were noted below, specifically I narrowed the voltage range and also increase the therapy duration to 10 hours.    Regarding hypoxia, I reviewed the recent overnight oximetry test.  This test shows oxygen saturations less than 80% for over 2 hours, this would suggest to me that she still should use supplemental oxygen in conjunction with inspire.  I will also have my office forward this to her pulmonologist to order the test to see if he agrees.    She knows to avoid drowsy driving, as she is clinically improved, there is no role for modafinil in her case.    She is due for [facility] recertification next April, I asked her to make sure she upload data from her inspire remote prior to that visit so I can print a report for her.    Inspire Settings  Outgoing Settings  Electrode configuration A + - +   Amplitude (V) 2.8  Range (V) 2.5 to 3.0  Pulse width (us)/ Rate (Hz)  90/33  Start delay (min) 30  Pause Time (min) 15  Therapy Duration (hr) 10     Subjective:      Patient ID: Isha Gallego is a 58 y.o. female.    HPI  Isha returns in a follow-up visit she has a history of obstructive sleep apnea treated with the inspire device.  Of note, she also had very significant weight loss since the implantation of the inspire device, as she has been treated with Wegovy.    She was diagnosed with obstructive sleep apnea 2022, respiratory event index 37.4 without signs of central apnea.  She was 224 pounds at the time of initial sleep testing.  She had surgery for implantation of the inspire device in December 2022 by   "Mireille.  Operative notes notes good stimulation at 0.7 V and no retraction at 0.4 V.  Her last visit with me, she had an outgoing setting of 2.8 V.  She had a fine-tune inspire sleep study and had an excellent result.  '    More recently, she had a diagnostic study in March 2024.  Weight was 175 pounds at the time of that test.  She slept without inspire for 165 minutes and had a respiratory disturbance index of 7.2, AHI of 3.6.  With use of inspire, the respiratory disturbance index was further reduced to 3.0.  Long hypopneas were observed without use of inspire    At her last visit with me in April 2024, she was treated with inspire at 2.7 V on the Default configuration, her voltage had been greatly reduced after weight loss, she still had good tongue protrusion in that setting.    We discussed that she had episodes of sleepiness, I have offered modafinil but she did not want to pursue that.    She also had symptoms of restless legs, most recent ferritin level was acceptable, oral iron was stopped.    Still using oxygen at 2 L/min     Isha is using Inspire consistently  She is less tired than before.  She has been taking Allegra later in the day and that has helped.   She goes to bed 9 pm   asleep in 30 minutes.  Sleeps through the night. She is up at 3 am   Off from work 9 pm , sleeps until  pm  Working 7 days a week    Not sleepy in the day.  No drowsy driving    Coffee- 24 oz a day    RLS_ \"not much lately\".  Kicks legs in sleep. Not on iron anymore     Inspire Settings  Incoming Settings  Electrode configuration A + - +   Amplitude (V) 2.8  Range (V) 2.5 to 3.5  Pulse width (us)/ Rate (Hz)  90/33  Start delay (min) 30  Pause Time (min) 15  Therapy Duration (hr) 9    Inspire Usage Data  Used 1361 hours, 57 hours per week          [place] Sleepiness Scale  Sitting and reading: (P) Would never doze  Watching TV: (P) Slight chance of dozing  Sitting, inactive in a public place (e.g. a theatre or a " "meeting): (P) Would never doze  As a passenger in a car for an hour without a break: (P) Would never doze  Lying down to rest in the afternoon when circumstances permit: (P) Slight chance of dozing  Sitting and talking to someone: (P) Would never doze  Sitting quietly after a lunch without alcohol: (P) Slight chance of dozing  In a car, while stopped for a few minutes in traffic: (P) Would never doze  Total score: (P) 3      Review of Systems  (As above unless noted)    Objective:      /72 (BP Location: Left arm, Patient Position: Sitting, Cuff Size: Large)   Pulse 81   Ht 5' 8\" (1.727 m)   Wt 77.1 kg (170 lb)   BMI 25.85 kg/m²          Physical Exam      Oximetry reviewed-   Sp02 less than or equal to 88% for 2 hr 25 min  Baseline sat 90%   "

## 2024-09-24 NOTE — Clinical Note
Hi-my office obtained the oximetry report, sats still look low.  I asked my office to forward the report to you or hopefully it should also be in media.

## 2024-10-31 ENCOUNTER — LAB (OUTPATIENT)
Age: 59
End: 2024-10-31
Payer: COMMERCIAL

## 2024-10-31 DIAGNOSIS — E05.00 GRAVES DISEASE: ICD-10-CM

## 2024-10-31 LAB
T3 SERPL-MCNC: 0.7 NG/ML
T4 FREE SERPL-MCNC: 0.85 NG/DL (ref 0.61–1.12)
TSH SERPL DL<=0.05 MIU/L-ACNC: 1.05 UIU/ML (ref 0.45–4.5)

## 2024-10-31 PROCEDURE — 84480 ASSAY TRIIODOTHYRONINE (T3): CPT

## 2024-10-31 PROCEDURE — 84443 ASSAY THYROID STIM HORMONE: CPT

## 2024-10-31 PROCEDURE — 36415 COLL VENOUS BLD VENIPUNCTURE: CPT

## 2024-10-31 PROCEDURE — 84439 ASSAY OF FREE THYROXINE: CPT

## 2024-11-01 NOTE — RESULT ENCOUNTER NOTE
Please let her know that I have her thyroid tests. She does not need any methimazole at this time. Thank you

## 2024-11-08 ENCOUNTER — HOSPITAL ENCOUNTER (OUTPATIENT)
Dept: CT IMAGING | Facility: HOSPITAL | Age: 59
Discharge: HOME | End: 2024-11-08
Attending: INTERNAL MEDICINE

## 2024-11-08 DIAGNOSIS — F17.211 CIGARETTE NICOTINE DEPENDENCE IN REMISSION: ICD-10-CM

## 2024-11-19 ENCOUNTER — RESULTS FOLLOW-UP (OUTPATIENT)
Dept: PULMONOLOGY | Facility: CLINIC | Age: 59
End: 2024-11-19

## 2024-12-03 ENCOUNTER — PATIENT MESSAGE (OUTPATIENT)
Dept: SLEEP CENTER | Facility: CLINIC | Age: 59
End: 2024-12-03

## 2024-12-03 DIAGNOSIS — G47.34 NOCTURNAL HYPOXEMIA: Primary | ICD-10-CM

## 2025-01-16 ENCOUNTER — OFFICE VISIT (OUTPATIENT)
Dept: ENDOCRINOLOGY | Facility: CLINIC | Age: 60
End: 2025-01-16
Payer: COMMERCIAL

## 2025-01-16 VITALS
WEIGHT: 170.9 LBS | SYSTOLIC BLOOD PRESSURE: 100 MMHG | DIASTOLIC BLOOD PRESSURE: 70 MMHG | HEIGHT: 68 IN | BODY MASS INDEX: 25.9 KG/M2

## 2025-01-16 DIAGNOSIS — E05.00 GRAVES DISEASE: Primary | ICD-10-CM

## 2025-01-16 PROCEDURE — 99213 OFFICE O/P EST LOW 20 MIN: CPT | Performed by: INTERNAL MEDICINE

## 2025-01-16 NOTE — ASSESSMENT & PLAN NOTE
TSH is still normal. She appears to be in remission. Will check TSH and Free t4 in 6mos and again in 1y. May be able to return to routine care with her PCP after that time is remission is durable.

## 2025-01-16 NOTE — PROGRESS NOTES
"Chief Complaint   Patient presents with   • Hyperthyroidism      Referring Provider  No referring provider defined for this encounter.     History of Present Illness:   Isha Gallego is a 59 y.o. female with hyperthyrodism seen for f/u. Last seen in July 2024    The chart is reviewed. She last saw Dr. Smith in Jan 2024. This note and the chart is reviewed.   Dx with Graves' Disease in May 2021. Needed I-123 uptake and scan in the past 5/12/2021. She did have sweating an feeling hot at times.   Was taking methimazole, reduced to 5mg three times per week. This was weaned down though cannot recall exact date.   This has been over 8-9 mos since she has taken any methimazole. She has her TSH from [facility] which is 1.2    Denies sweating. Reports hair loss and brittle nails, and feeling \"jittery\"intermittently.  Reports at time her eyes get \"cloudy\" but no other eye issues    Denies changes in neck, changes in voice. She has dysphagia to dry food or large food pieces. Radiation exposure to head/neck/chest  Medications impacting the thyroid     Hx of thyroid nodules. Small nodules seen in 2014    Seeing weight management at [facility] and on [facility].     Patient Active Problem List   Diagnosis   • HTN (hypertension)   • GERD (gastroesophageal reflux disease)   • Multiple thyroid nodules   • ENRRIQUE (obstructive sleep apnea)   • Lumbar spondylosis   • Graves disease   • Spondylolisthesis, acquired   • Osteoarthritis of spine with radiculopathy, lumbar region   • Anxiety   • Nocturnal hypoxemia   • History of cigarette smoking   • Periodic limb movement sleep disorder   • Current every day nicotine vaping   • Hypersomnolence disorder, persistent, moderate, with another sleep disorder   • Cigarette nicotine dependence in remission      Past Medical History:   Diagnosis Date   • Allergic rhinitis    • Anxiety    • Arthritis    • Asthma    • Colon polyp    • COPD (chronic obstructive pulmonary disease) (HCC)    • COVID     Dec 2020   • CPAP " (continuous positive airway pressure) dependence    • Disease of thyroid gland    • GERD (gastroesophageal reflux disease)    • History of lumbar fusion    • Hypertension    • Osteoporosis    • Skin cancer     on nose in past   • Sleep apnea    • Vapes nicotine containing substance    • Wears glasses       Past Surgical History:   Procedure Laterality Date   • APPENDECTOMY     • BACK SURGERY      lumbar fusion   • COLONOSCOPY     • SC OPEN IMPLTJ HPGLSL NRV NSTIM RA PG&RESPIR SENSOR N/A 2022    Procedure: INSERTION UPPER AIRWAY STIMULATOR (INSPIRE IMPLANT);  Surgeon: Balwinder Kendrick MD;  Location: [facility];  Service: ENT   • TONSILLECTOMY     • TUBAL LIGATION     • WISDOM TOOTH EXTRACTION        Family History   Problem Relation Age of Onset   • Hypertension Mother    • Polycystic kidney disease Mother    • Sleep apnea Mother    • Hypertension Father      Social History     Tobacco Use   • Smoking status: Former     Current packs/day: 0.00     Average packs/day: 0.5 packs/day for 45.7 years (22.9 ttl pk-yrs)     Types: Cigarettes     Start date:      Quit date: 10/2022     Years since quittin.2   • Smokeless tobacco: Never   • Tobacco comments:     Quit cigg Oct 2022- vapes nicotene daily   Substance Use Topics   • Alcohol use: Yes     Comment: occas.      Allergies   Allergen Reactions   • Chantix [Varenicline] GI Intolerance         Current Outpatient Medications:   •  amitriptyline (ELAVIL) 25 mg tablet, Take 25 mg by mouth daily at bedtime, Disp: , Rfl:   •  amLODIPine (NORVASC) 5 mg tablet, Take 5 mg by mouth daily, Disp: , Rfl:   •  ascorbic acid (VITAMIN C) 1000 MG tablet, , Disp: , Rfl:   •  aspirin (ECOTRIN LOW STRENGTH) 81 mg EC tablet, 81 mg daily Pt took last dose , Disp: , Rfl:   •  cholecalciferol (VITAMIN D3) 1,000 units tablet, Take 1,000 Units by mouth daily, Disp: , Rfl:   •  furosemide (LASIX) 40 mg tablet, Take 40 mg by mouth daily with lunch, Disp: , Rfl:  "  •  metoprolol tartrate (LOPRESSOR) 50 mg tablet, Take 50 mg by mouth every 12 (twelve) hours, Disp: , Rfl:   •  Multiple Vitamins-Minerals (CENTRUM SILVER 50+WOMEN) TABS, Take by mouth daily, Disp: , Rfl:   •  Omega-3 Fatty Acids (fish oil) 1,000 mg, Take 1,000 mg by mouth daily, Disp: , Rfl:   •  omeprazole (PriLOSEC) 40 MG capsule, Take 40 mg by mouth every evening Take before a meal, Disp: , Rfl:   •  PARoxetine (PAXIL) 20 mg tablet, Take 20 mg by mouth daily, Disp: , Rfl:   •  terazosin (HYTRIN) 5 mg capsule, Take 2 mg by mouth daily at bedtime, Disp: , Rfl:   •  Wegovy 2.4 MG/0.75ML, INJECT 2.4 MG SUBCUTANEOUSLY ONE TIME PER WEEK, Disp: , Rfl:   •  fexofenadine (ALLEGRA) 180 MG tablet, Take 180 mg by mouth daily (Patient not taking: Reported on 1/16/2025), Disp: , Rfl:   Review of Systems   Constitutional:  Negative for unexpected weight change.   HENT:  Positive for trouble swallowing. Negative for voice change.    Cardiovascular:  Positive for palpitations.   Gastrointestinal:  Positive for constipation.   Musculoskeletal:  Negative for gait problem.   Neurological:  Negative for tremors.   Psychiatric/Behavioral:  The patient is not nervous/anxious.         \"I get jittery\"       Physical Exam:  Body mass index is 25.99 kg/m².  /70   Ht 5' 8\" (1.727 m)   Wt 77.5 kg (170 lb 14.4 oz)   BMI 25.99 kg/m²    Wt Readings from Last 3 Encounters:   01/16/25 77.5 kg (170 lb 14.4 oz)   09/24/24 77.1 kg (170 lb)   08/30/24 80 kg (176 lb 6.4 oz)       GEN: NAD  E/n/m nl facies, hearing intact bilat, tongue midline, lips nl  Eyes: no stare or proptosis, nl lids, EOMI  Neck: trachea midline, thyroid NT to palpation, nl in size, no nodules or neck masses noted, no cervical LAD  Neuro: no tremor  MS: no c/c in digits, moves all 4 ext, nl muscle bulk, gait nl  Skin: warm and dry, no palmar erythema  Psych: nl mood and affect, no gross lapses in memory    DATA:  Labs:       Lab Results   Component Value Date    TSH " 1.26 01/16/2025    FREET4 0.85 10/31/2024         Radiology  2014 thyroid ultrasound  Left midpole posterior hypoechoic nodule 0.4 x 0.2 x 0.4 cm unchanged.   The left lower pole nodule is no longer visualized.     Right upper pole septated cyst 0.6 x 0.5 x 0.5 cm unchanged.   Right midpole lateral septated cyst 0.5 x 0.3 x 0.3 cm unchanged.     Impression/Plan:  Problem List Items Addressed This Visit     Graves disease - Primary    TSH is still normal. She appears to be in remission. Will check TSH and Free t4 in 6mos and again in 1y. May be able to return to routine care with her PCP after that time is remission is durable.         Relevant Orders    TSH, 3rd generation    T4, free                  Discussed with the patient and all questioned fully answered. She will call me if any problems arise.        Elle Sexton MD

## 2025-01-17 NOTE — PATIENT COMMUNICATION
Patient called let the office know that you need to fax all requests for the oxygen and machine to [employer] to Fax Number: [phone]

## 2025-01-27 NOTE — PATIENT COMMUNICATION
Patient called the RX Refill Line. Message is being forwarded to the office.     Patient is requesting a message be sent to the office regarding her request for her oxygen supplies. She states that she was contacted by the company that a new order would need to be placed with the amount of liters. Please review and re-fax to [phone]     Please contact patient at [phone] once completed as she will be traveling soon.

## 2025-01-29 NOTE — PATIENT COMMUNICATION
Received call from patient who states she spoke to Félix at [employer] [facility] [employer](phone number [phone]) and he confirmed that they received the oxygen Rx today and it looks good.    Félix asked patient if she requires oxygen on the plane ride. Patient states she was unsure what to tell him.  Advised patient that oxygen is for nocturnal use with Inspire only and would not be required on the plane ride.  Patient will notify [employer] [facility] [employer] of this.

## 2025-03-27 ENCOUNTER — OFFICE VISIT (OUTPATIENT)
Dept: SLEEP CENTER | Facility: CLINIC | Age: 60
End: 2025-03-27
Payer: COMMERCIAL

## 2025-03-27 VITALS
WEIGHT: 176 LBS | BODY MASS INDEX: 26.67 KG/M2 | SYSTOLIC BLOOD PRESSURE: 108 MMHG | HEIGHT: 68 IN | DIASTOLIC BLOOD PRESSURE: 72 MMHG

## 2025-03-27 DIAGNOSIS — G47.34 NOCTURNAL HYPOXEMIA: ICD-10-CM

## 2025-03-27 DIAGNOSIS — G47.33 OSA (OBSTRUCTIVE SLEEP APNEA): Primary | ICD-10-CM

## 2025-03-27 DIAGNOSIS — Z96.82 S/P INSERTION OF HYPOGLOSSAL NERVE STIMULATOR: ICD-10-CM

## 2025-03-27 PROCEDURE — 99213 OFFICE O/P EST LOW 20 MIN: CPT | Performed by: PSYCHIATRY & NEUROLOGY

## 2025-03-27 PROCEDURE — 95976 ALYS SMPL CN NPGT PRGRMG: CPT | Performed by: PSYCHIATRY & NEUROLOGY

## 2025-03-27 NOTE — PROGRESS NOTES
Name: Isha Gallego      : 1965      MRN: [id number]  Encounter Provider: Eliezer Aguilar MD  Encounter Date: 3/27/2025   Encounter department: [facility]  :  Assessment & Plan  ENRRIQUE (obstructive sleep apnea)  -doing great has no sign of downward deflection  As she has some tongue scraping which bothers her, changed pulse width and rate today, she found outgoing settings to be more comfortable  -discussed if comofrtable she can stay at this setting, if not effective can increase by one level every 1-2 weeks   Orders:  •  Diagnostic Sleep Study with Inspire; Future    S/P insertion of hypoglossal nerve stimulator  Inspire Settings  Outgoing Settings  Electrode configuration A + - +   Amplitude (V) 2.0  Range (V) 1.9 to 2.3  Pulse width (us)/ Rate (Hz)  120/40  Start delay (min) 15  Pause Time (min) 15  Therapy Duration (hr) 10      Orders:  •  Diagnostic Sleep Study with Inspire; Future    Nocturnal hypoxemia  -should continue oxygen with Inspire per pulmonary recommendations            History of Present Illness              Isha returns in a follow-up visit.  She has a history of obstructive sleep apnea treated with the inspire device.  Also has had improvement of obstructive sleep apnea with Wegovy.  She has been treated with supplemental oxygen due to persistent hypoxia.    She was diagnosed with obstructive sleep apnea , respiratory event index 37.4 without signs of central apnea.  She was 224 pounds at the time of initial sleep testing.  She had surgery for implantation of the inspire device in 2022 by Dr. Kendrick.  Operative notes notes good stimulation at 0.7 V and no retraction at 0.4 V.       More recently, she had a diagnostic study in 2024.  Weight was 175 pounds at the time of that test.  She slept without inspire for 165 minutes and had a respiratory disturbance index of 7.2, AHI of 3.6.  With use of inspire, the respiratory disturbance  index was further reduced to 3.0.  Long hypopneas were observed without use of inspire     At her visit with me in April 2024, she was treated with inspire at 2.7 V on the Default configuration, her voltage had been greatly reduced after weight loss, she still had good tongue protrusion in that setting.  September 2024-settings continued at 2.8 V (a 90/33). Dr Loza recommended continued treatment with supplemental oxygen.    Isha continues to use inspire on a regular basis.  She finds beneficial with better sleep, less sleepiness, and fewer headaches.  She has had some scraping of her tongue against her teeth with use of inspire.  This does not limit her usage. She notices this when it turns on as it awakes.      She goes to bed at 9 PM, is asleep within 45 minutes.  She sleeps through the night and is up early at  3 AM for work but her rise time varies as her schedule varies a lot.    When not working she goes to bed around 11 PM and is up at 6:30 AM.    She is usually refreshed upon awakening, sometimes feels sleepy during the day.  She denies drowsy driving.  Does not take naps and does not fall asleep unintentionally.  She is no longer tired, changed the timing of her meds and this helped     Still has oxygen at 2 L/min.      Inspire Settings  Incoming Settings  Electrode configuration A + - +   Amplitude (V) 2.8  Range (V) 2.5 to 3.0  Pulse width (us)/ Rate (Hz)  90/33  Start delay (min) 30  Pause Time (min) 15  Therapy Duration (hr) 10    Inspire Usage Data  Used 30 of 30 days, average session 8 hr 10 min, 0.1 pauses per night      Sitting and reading: (Patient-Rptd) (P) Slight chance of dozing  Watching TV: (Patient-Rptd) (P) Slight chance of dozing  Sitting, inactive in a public place (e.g. a theatre or a meeting): (Patient-Rptd) (P) Would never doze  As a passenger in a car for an hour without a break: (Patient-Rptd) (P) Would never doze  Lying down to rest in the afternoon when circumstances permit:  "(Patient-Rptd) (P) Slight chance of dozing  Sitting and talking to someone: (Patient-Rptd) (P) Would never doze  Sitting quietly after a lunch without alcohol: (Patient-Rptd) (P) Would never doze  In a car, while stopped for a few minutes in traffic: (Patient-Rptd) (P) Would never doze  Total score: (Patient-Rptd) (P) 3     Review of Systems  Pertinent positives/negatives included in HPI and also as noted:       Objective   /72 (BP Location: Left arm, Patient Position: Sitting, Cuff Size: Adult)   Ht 5' 8\" (1.727 m)   Wt 79.8 kg (176 lb)   BMI 26.76 kg/m²        Physical Exam    A 90/33  2.9 v- prominent protrusion  2.5- the same, but more comfortable  2.0- sensation but not protrusion     120/33  Sensation at 1.8 V, no protrusion    120/40  1.8- no protrusion, sensation  2.0 v- protrusion, comfortable \"doesn't pull my tongue as much\"    150/33  2.0 v- protrusion, comfortable \"doesn't pull my tongue as much\"  1.8v- stiffening, no protrusion    Data  Lab Results   Component Value Date    HGB 14.4 12/02/2022    HCT 44.0 12/02/2022    MCV 89 12/02/2022      Lab Results   Component Value Date    CALCIUM 9.6 04/15/2024    K 4.2 04/15/2024    CO2 33 (H) 04/15/2024     04/15/2024    BUN 15 04/15/2024    CREATININE 0.96 04/15/2024     Lab Results   Component Value Date    IRON 80 03/05/2024    TIBC 283 03/05/2024    FERRITIN 106.0 03/05/2024     Lab Results   Component Value Date    AST 15 04/15/2024    ALT 9 04/15/2024         "

## 2025-03-27 NOTE — ASSESSMENT & PLAN NOTE
-doing great has no sign of downward deflection  As she has some tongue scraping which bothers her, changed pulse width and rate today, she found outgoing settings to be more comfortable  -discussed if comofrtable she can stay at this setting, if not effective can increase by one level every 1-2 weeks   Orders:  •  Diagnostic Sleep Study with Inspire; Future

## 2025-04-30 ENCOUNTER — HOSPITAL ENCOUNTER (OUTPATIENT)
Dept: SLEEP CENTER | Facility: CLINIC | Age: 60
Discharge: HOME | End: 2025-04-30
Payer: COMMERCIAL

## 2025-04-30 DIAGNOSIS — Z96.82 S/P INSERTION OF HYPOGLOSSAL NERVE STIMULATOR: ICD-10-CM

## 2025-04-30 DIAGNOSIS — G47.33 OSA (OBSTRUCTIVE SLEEP APNEA): ICD-10-CM

## 2025-04-30 PROCEDURE — 95810 POLYSOM 6/> YRS 4/> PARAM: CPT | Performed by: PSYCHIATRY & NEUROLOGY

## 2025-04-30 PROCEDURE — 95810 POLYSOM 6/> YRS 4/> PARAM: CPT

## 2025-04-30 PROCEDURE — 95970 ALYS NPGT W/O PRGRMG: CPT

## 2025-05-01 NOTE — PROGRESS NOTES
Sleep Study Documentation    Pre-Sleep Study       Sleep testing procedure explained to patient:YES    Patient napped prior to study:NO    Caffeine:Dayshift worker after 12PM.  Caffeine use:YES- coffee  18 ounces or more,  soda 12 to 36 ounces.    Alcohol:Dayshift workers after 5PM: Alcohol use:NO    Typical day for patient:YES       Study Documentation    Sleep Study Indications: hx ENRRIQUE s/p inspire    Sleep Study: Treatment   Optimal : 1.9V  REM Obtained:yes  Supplemental O2: yes  O2 flow rate (L/min) range 2  O2 flow rate (L/min) final 2  Minimum SaO2 93  Baseline SaO2 98    Minimum SaO2 at final  93      Mode of Therapy: INSPRE    EKG abnormalities: no     EEG abnormalities: no    Were abnormal behaviors in sleep observed:NO    Is Total Sleep Study Recording Time < 2 hours: N/A    Is Total Sleep Study Recording Time > 2 hours but study is incomplete: N/A    Is Total Sleep Study Recording Time 6 hours or more but sleep was not obtained: NO    Patient classification: employed       Post-Sleep Study    Medication used at bedtime or during sleep study:YES prescription sleep aid    Patient reports time it took to fall asleep:30 to 60 minutes    Patient reports waking up during study:Denied    Patient reports sleeping 4 to 6 hours without dreaming.    Does the Patient feel this is a typical night of sleep:typical    Patient rated sleepiness: Not sleepy or tired    PAP treatment:no.

## 2025-05-04 PROBLEM — Z96.82 S/P INSERTION OF HYPOGLOSSAL NERVE STIMULATOR: Status: ACTIVE | Noted: 2025-05-04

## 2025-05-05 ENCOUNTER — RESULTS FOLLOW-UP (OUTPATIENT)
Dept: SLEEP CENTER | Facility: HOSPITAL | Age: 60
End: 2025-05-05

## 2025-05-22 NOTE — TELEPHONE ENCOUNTER
Diagnostic study with Ekaterina resulted:  Result looks great- no changes needed   I can see her in 3 months     Call placed to patient, left message advising of above and re-scheduling 10/16/25 appointment to August to meet 3 month follow-up recommended by Dr. Aguilar.  Telephone number , #1, #3 provided to schedule.

## 2025-06-14 LAB
T4 FREE SERPL-MCNC: 0.97 NG/DL (ref 0.61–1.12)
TSH SERPL-ACNC: 1.02 UIU/ML (ref 0.45–5.33)

## 2025-08-14 ENCOUNTER — TELEPHONE (OUTPATIENT)
Dept: PULMONOLOGY | Facility: CLINIC | Age: 60
End: 2025-08-14

## 2025-08-18 ENCOUNTER — OFFICE VISIT (OUTPATIENT)
Dept: PULMONOLOGY | Facility: CLINIC | Age: 60
End: 2025-08-18
Payer: COMMERCIAL

## 2025-08-18 VITALS
BODY MASS INDEX: 29.1 KG/M2 | DIASTOLIC BLOOD PRESSURE: 86 MMHG | OXYGEN SATURATION: 95 % | TEMPERATURE: 95.8 F | HEART RATE: 73 BPM | SYSTOLIC BLOOD PRESSURE: 120 MMHG | WEIGHT: 191.4 LBS

## 2025-08-18 DIAGNOSIS — G47.34 NOCTURNAL HYPOXEMIA: Primary | ICD-10-CM

## 2025-08-18 DIAGNOSIS — G47.33 OSA (OBSTRUCTIVE SLEEP APNEA): ICD-10-CM

## 2025-08-18 DIAGNOSIS — Z72.0 CURRENT EVERY DAY NICOTINE VAPING: ICD-10-CM

## 2025-08-18 DIAGNOSIS — F17.211 CIGARETTE NICOTINE DEPENDENCE IN REMISSION: ICD-10-CM

## 2025-08-18 PROCEDURE — 99214 OFFICE O/P EST MOD 30 MIN: CPT | Performed by: INTERNAL MEDICINE

## 2025-08-18 RX ORDER — MONTELUKAST SODIUM 10 MG/1
10 TABLET ORAL EVERY EVENING
COMMUNITY
Start: 2025-07-28

## (undated) DEVICE — DRAPE SHEET THREE QUARTER

## (undated) DEVICE — INTENDED FOR TISSUE SEPARATION, AND OTHER PROCEDURES THAT REQUIRE A SHARP SURGICAL BLADE TO PUNCTURE OR CUT.: Brand: BARD-PARKER SAFETY BLADES SIZE 15, STERILE

## (undated) DEVICE — SUT SILK 3-0 RB-1 CV-23 18IN C053D

## (undated) DEVICE — PACK UNIVERSAL NECK

## (undated) DEVICE — 3M™ TEGADERM™ TRANSPARENT FILM DRESSING FRAME STYLE, 1624W, 2-3/8 IN X 2-3/4 IN (6 CM X 7 CM), 100/CT 4CT/CASE: Brand: 3M™ TEGADERM™

## (undated) DEVICE — 10FR FRAZIER SUCTION HANDLE: Brand: CARDINAL HEALTH

## (undated) DEVICE — SUT MONOCRYL 4-0 PS-2 27 IN Y426H

## (undated) DEVICE — BIPOLAR CORD DISP

## (undated) DEVICE — NEEDLE 18 G X 1 1/2

## (undated) DEVICE — SPONGE CHERRY 1/2IN

## (undated) DEVICE — 3M™ TEGADERM™ TRANSPARENT FILM DRESSING FRAME STYLE, 1626W, 4 IN X 4-3/4 IN (10 CM X 12 CM), 50/CT 4CT/CASE: Brand: 3M™ TEGADERM™

## (undated) DEVICE — ELECTRODE 8227304 5PK PRASS PR 18MM ROHS

## (undated) DEVICE — GAUZE SPONGES,16 PLY: Brand: CURITY

## (undated) DEVICE — NEEDLE 25G X 1 1/2

## (undated) DEVICE — SUT VICRYL 3-0 SH 27 IN J416H

## (undated) DEVICE — WET SKIN PREP TRAY: Brand: MEDLINE INDUSTRIES, INC.

## (undated) DEVICE — IV CATH INTROCAN 18G X 1 1/4 SAFETY

## (undated) DEVICE — REMOTE SLEEP INSPIRE

## (undated) DEVICE — PROVE COVER: Brand: UNBRANDED

## (undated) DEVICE — SUT SILK PERMA-HAND 3-0 18IN A182H

## (undated) DEVICE — 3M™ TEGADERM™ TRANSPARENT FILM DRESSING FRAME STYLE, 1627, 4 IN X 10 IN (10 CM X 25 CM), 20/CT 4CT/CASE: Brand: 3M™ TEGADERM™

## (undated) DEVICE — VESSEL LOOP MAXI - RED

## (undated) DEVICE — GLOVE SRG BIOGEL 7.5

## (undated) DEVICE — MASTISOL LIQ ADHESIVE 2/3ML

## (undated) DEVICE — SPONGE 4 X 4 XRAY 16 PLY STRL LF RFD

## (undated) DEVICE — 3M™ STERI-STRIP™ REINFORCED ADHESIVE SKIN CLOSURES, R1547, 1/2 IN X 4 IN (12 MM X 100 MM), 6 STRIPS/ENVELOPE: Brand: 3M™ STERI-STRIP™

## (undated) DEVICE — SUT SILK 2-0 SH 30 IN K833H

## (undated) DEVICE — SKIN MARKER DUAL TIP WITH RULER CAP, FLEXIBLE RULER AND LABELS: Brand: DEVON

## (undated) DEVICE — TIBURON SPLIT SHEET: Brand: CONVERTORS

## (undated) DEVICE — PROBE 8225401 5PK SD-SD BIPOL STIM ROHS

## (undated) DEVICE — VESSEL LOOPS X-RAY DETECTABLE: Brand: DEROYAL

## (undated) DEVICE — GAUZE SPONGES,USP TYPE VII GAUZE, 12 PLY: Brand: CURITY

## (undated) DEVICE — GLOVE INDICATOR PI UNDERGLOVE SZ 7.5 BLUE

## (undated) DEVICE — ALCOHOL PREP, 2 PLY, LARGE, MADE IN USA, SATURATED WITH 70% ISOPROPYL ALCOHOL, FOR EXTERNAL USE ONLY: Brand: WEBCOL

## (undated) DEVICE — SYRINGE 10ML LL

## (undated) DEVICE — 3M™ IOBAN™ 2 ANTIMICROBIAL INCISE DRAPE 6650EZ: Brand: IOBAN™ 2